# Patient Record
Sex: FEMALE | Race: WHITE | NOT HISPANIC OR LATINO | Employment: FULL TIME | URBAN - NONMETROPOLITAN AREA
[De-identification: names, ages, dates, MRNs, and addresses within clinical notes are randomized per-mention and may not be internally consistent; named-entity substitution may affect disease eponyms.]

---

## 2023-05-21 DIAGNOSIS — D51.0 VITAMIN B12 DEFICIENCY ANEMIA DUE TO INTRINSIC FACTOR DEFICIENCY: ICD-10-CM

## 2023-05-22 RX ORDER — CYANOCOBALAMIN 1000 UG/ML
INJECTION, SOLUTION INTRAMUSCULAR; SUBCUTANEOUS
Qty: 3 ML | Refills: 1 | OUTPATIENT
Start: 2023-05-22

## 2024-03-12 ENCOUNTER — OFFICE VISIT (OUTPATIENT)
Dept: PRIMARY CARE | Facility: CLINIC | Age: 59
End: 2024-03-12
Payer: COMMERCIAL

## 2024-03-12 ENCOUNTER — LAB (OUTPATIENT)
Dept: LAB | Facility: LAB | Age: 59
End: 2024-03-12
Payer: COMMERCIAL

## 2024-03-12 VITALS
OXYGEN SATURATION: 95 % | RESPIRATION RATE: 16 BRPM | HEIGHT: 63 IN | BODY MASS INDEX: 41.04 KG/M2 | SYSTOLIC BLOOD PRESSURE: 136 MMHG | WEIGHT: 231.6 LBS | HEART RATE: 81 BPM | DIASTOLIC BLOOD PRESSURE: 87 MMHG

## 2024-03-12 DIAGNOSIS — Z12.39 BREAST SCREENING: ICD-10-CM

## 2024-03-12 DIAGNOSIS — E66.01 CLASS 3 SEVERE OBESITY WITHOUT SERIOUS COMORBIDITY WITH BODY MASS INDEX (BMI) OF 40.0 TO 44.9 IN ADULT, UNSPECIFIED OBESITY TYPE (MULTI): ICD-10-CM

## 2024-03-12 DIAGNOSIS — Z13.9 SCREENING DUE: Primary | ICD-10-CM

## 2024-03-12 DIAGNOSIS — D50.9 IRON DEFICIENCY ANEMIA, UNSPECIFIED IRON DEFICIENCY ANEMIA TYPE: ICD-10-CM

## 2024-03-12 DIAGNOSIS — Z12.11 ENCOUNTER FOR SCREENING FOR MALIGNANT NEOPLASM OF COLON: ICD-10-CM

## 2024-03-12 DIAGNOSIS — Z13.9 SCREENING DUE: ICD-10-CM

## 2024-03-12 PROBLEM — F51.01 PRIMARY INSOMNIA: Status: ACTIVE | Noted: 2024-03-12

## 2024-03-12 PROBLEM — D64.9 ANEMIA: Status: ACTIVE | Noted: 2024-03-12

## 2024-03-12 PROBLEM — L08.9 INFECTION OF SKIN: Status: ACTIVE | Noted: 2024-03-12

## 2024-03-12 PROBLEM — R53.83 FATIGUE: Status: ACTIVE | Noted: 2024-03-12

## 2024-03-12 PROBLEM — B37.31 VULVOVAGINAL CANDIDIASIS: Status: ACTIVE | Noted: 2024-03-12

## 2024-03-12 PROBLEM — J30.9 ALLERGIC RHINITIS: Status: ACTIVE | Noted: 2024-03-12

## 2024-03-12 PROBLEM — R21 RASH: Status: ACTIVE | Noted: 2024-03-12

## 2024-03-12 PROBLEM — F17.210 CIGARETTE NICOTINE DEPENDENCE WITHOUT COMPLICATION: Status: ACTIVE | Noted: 2024-03-12

## 2024-03-12 PROBLEM — E78.5 DYSLIPIDEMIA: Status: ACTIVE | Noted: 2024-03-12

## 2024-03-12 PROBLEM — D51.0 PERNICIOUS ANEMIA: Status: ACTIVE | Noted: 2024-03-12

## 2024-03-12 PROBLEM — E55.9 VITAMIN D DEFICIENCY: Status: ACTIVE | Noted: 2024-03-12

## 2024-03-12 PROBLEM — E66.9 OBESITY: Status: ACTIVE | Noted: 2024-03-12

## 2024-03-12 PROBLEM — R73.01 ELEVATED FASTING BLOOD SUGAR: Status: ACTIVE | Noted: 2024-03-12

## 2024-03-12 LAB
APPEARANCE UR: CLEAR
BASOPHILS # BLD AUTO: 0.03 X10*3/UL (ref 0–0.1)
BASOPHILS NFR BLD AUTO: 0.4 %
BILIRUB UR STRIP.AUTO-MCNC: NEGATIVE MG/DL
COLOR UR: YELLOW
EOSINOPHIL # BLD AUTO: 0.06 X10*3/UL (ref 0–0.7)
EOSINOPHIL NFR BLD AUTO: 0.8 %
ERYTHROCYTE [DISTWIDTH] IN BLOOD BY AUTOMATED COUNT: 12.1 % (ref 11.5–14.5)
GLUCOSE UR STRIP.AUTO-MCNC: NEGATIVE MG/DL
HCT VFR BLD AUTO: 46.7 % (ref 36–46)
HGB BLD-MCNC: 15.3 G/DL (ref 12–16)
IMM GRANULOCYTES # BLD AUTO: 0.02 X10*3/UL (ref 0–0.7)
IMM GRANULOCYTES NFR BLD AUTO: 0.3 % (ref 0–0.9)
KETONES UR STRIP.AUTO-MCNC: ABNORMAL MG/DL
LEUKOCYTE ESTERASE UR QL STRIP.AUTO: NEGATIVE
LYMPHOCYTES # BLD AUTO: 1.95 X10*3/UL (ref 1.2–4.8)
LYMPHOCYTES NFR BLD AUTO: 24.8 %
MCH RBC QN AUTO: 31.2 PG (ref 26–34)
MCHC RBC AUTO-ENTMCNC: 32.8 G/DL (ref 32–36)
MCV RBC AUTO: 95 FL (ref 80–100)
MONOCYTES # BLD AUTO: 0.62 X10*3/UL (ref 0.1–1)
MONOCYTES NFR BLD AUTO: 7.9 %
NEUTROPHILS # BLD AUTO: 5.19 X10*3/UL (ref 1.2–7.7)
NEUTROPHILS NFR BLD AUTO: 65.8 %
NITRITE UR QL STRIP.AUTO: NEGATIVE
NRBC BLD-RTO: 0 /100 WBCS (ref 0–0)
PH UR STRIP.AUTO: 6 [PH]
PLATELET # BLD AUTO: 207 X10*3/UL (ref 150–450)
PLATELET CLUMP BLD QL SMEAR: PRESENT
PROT UR STRIP.AUTO-MCNC: NEGATIVE MG/DL
RBC # BLD AUTO: 4.9 X10*6/UL (ref 4–5.2)
RBC # UR STRIP.AUTO: NEGATIVE /UL
RBC MORPH BLD: NORMAL
SP GR UR STRIP.AUTO: 1.02
TSH SERPL-ACNC: NORMAL M[IU]/L
UROBILINOGEN UR STRIP.AUTO-MCNC: 2 MG/DL
WBC # BLD AUTO: 7.9 X10*3/UL (ref 4.4–11.3)

## 2024-03-12 PROCEDURE — 83735 ASSAY OF MAGNESIUM: CPT

## 2024-03-12 PROCEDURE — 80053 COMPREHEN METABOLIC PANEL: CPT

## 2024-03-12 PROCEDURE — 84443 ASSAY THYROID STIM HORMONE: CPT

## 2024-03-12 PROCEDURE — 36415 COLL VENOUS BLD VENIPUNCTURE: CPT

## 2024-03-12 PROCEDURE — 81003 URINALYSIS AUTO W/O SCOPE: CPT

## 2024-03-12 PROCEDURE — 83550 IRON BINDING TEST: CPT

## 2024-03-12 PROCEDURE — 82728 ASSAY OF FERRITIN: CPT

## 2024-03-12 PROCEDURE — 83540 ASSAY OF IRON: CPT

## 2024-03-12 PROCEDURE — 99214 OFFICE O/P EST MOD 30 MIN: CPT

## 2024-03-12 PROCEDURE — 82607 VITAMIN B-12: CPT

## 2024-03-12 PROCEDURE — 82306 VITAMIN D 25 HYDROXY: CPT

## 2024-03-12 PROCEDURE — 86038 ANTINUCLEAR ANTIBODIES: CPT

## 2024-03-12 PROCEDURE — 85025 COMPLETE CBC W/AUTO DIFF WBC: CPT

## 2024-03-12 PROCEDURE — 3008F BODY MASS INDEX DOCD: CPT

## 2024-03-12 PROCEDURE — 82746 ASSAY OF FOLIC ACID SERUM: CPT

## 2024-03-12 PROCEDURE — 83036 HEMOGLOBIN GLYCOSYLATED A1C: CPT

## 2024-03-12 RX ORDER — BISMUTH SUBSALICYLATE 262 MG
1 TABLET,CHEWABLE ORAL DAILY
COMMUNITY

## 2024-03-12 RX ORDER — DIPHENHYDRAMINE HCL 25 MG
25 CAPSULE ORAL NIGHTLY PRN
COMMUNITY
End: 2024-05-13

## 2024-03-12 ASSESSMENT — ENCOUNTER SYMPTOMS
FATIGUE: 1
HEMATOLOGIC/LYMPHATIC NEGATIVE: 1
NEUROLOGICAL NEGATIVE: 1
PSYCHIATRIC NEGATIVE: 1
GASTROINTESTINAL NEGATIVE: 1
CARDIOVASCULAR NEGATIVE: 1
ARTHRALGIAS: 1
ALLERGIC/IMMUNOLOGIC NEGATIVE: 1
RESPIRATORY NEGATIVE: 1
MYALGIAS: 1

## 2024-03-12 ASSESSMENT — PATIENT HEALTH QUESTIONNAIRE - PHQ9
SUM OF ALL RESPONSES TO PHQ9 QUESTIONS 1 AND 2: 0
1. LITTLE INTEREST OR PLEASURE IN DOING THINGS: NOT AT ALL
2. FEELING DOWN, DEPRESSED OR HOPELESS: NOT AT ALL

## 2024-03-12 ASSESSMENT — COLUMBIA-SUICIDE SEVERITY RATING SCALE - C-SSRS
2. HAVE YOU ACTUALLY HAD ANY THOUGHTS OF KILLING YOURSELF?: NO
6. HAVE YOU EVER DONE ANYTHING, STARTED TO DO ANYTHING, OR PREPARED TO DO ANYTHING TO END YOUR LIFE?: NO
1. IN THE PAST MONTH, HAVE YOU WISHED YOU WERE DEAD OR WISHED YOU COULD GO TO SLEEP AND NOT WAKE UP?: NO

## 2024-03-12 ASSESSMENT — PAIN SCALES - GENERAL: PAINLEVEL: 0-NO PAIN

## 2024-03-12 NOTE — PROGRESS NOTES
"Subjective   Patient ID: Maryuri Barrett is a 58 y.o. female who presents for Establish Care. Needs lab work and referral for hematology.  Today she is accompanied by alone.     Maryuri is here to establish care with this provider.  She has a PMH of gastric bypass, iron deficiency, B12 deficiency. She stated she feels she may be low in iron again.  She has not taken any iron in over 1 year, she was getting iron infusions. She has been tired and achy. She works from home as a .         Review of Systems   Constitutional:  Positive for fatigue.   HENT: Negative.     Respiratory: Negative.     Cardiovascular: Negative.    Gastrointestinal: Negative.    Genitourinary: Negative.    Musculoskeletal:  Positive for arthralgias and myalgias.   Skin: Negative.    Allergic/Immunologic: Negative.    Neurological: Negative.    Hematological: Negative.    Psychiatric/Behavioral: Negative.         Objective   /87 (BP Location: Right arm)   Pulse 81   Resp 16   Ht 1.6 m (5' 3\")   Wt 105 kg (231 lb 9.6 oz)   SpO2 95%   BMI 41.03 kg/m²   BSA: 2.16 meters squared  Growth percentiles: Facility age limit for growth %jaime is 20 years. Facility age limit for growth %jaime is 20 years.     Physical Exam  Vitals and nursing note reviewed.   Constitutional:       Appearance: Normal appearance. She is normal weight.   HENT:      Head: Normocephalic.      Nose: Nose normal.      Mouth/Throat:      Mouth: Mucous membranes are moist.      Pharynx: Oropharynx is clear.   Eyes:      Extraocular Movements: Extraocular movements intact.      Conjunctiva/sclera: Conjunctivae normal.      Pupils: Pupils are equal, round, and reactive to light.   Cardiovascular:      Rate and Rhythm: Normal rate and regular rhythm.      Pulses: Normal pulses.      Heart sounds: Normal heart sounds.   Pulmonary:      Effort: Pulmonary effort is normal.      Breath sounds: Normal breath sounds.   Abdominal:      General: Abdomen is flat. Bowel " "sounds are normal.      Palpations: Abdomen is soft.   Musculoskeletal:         General: Normal range of motion.      Cervical back: Normal range of motion and neck supple.   Skin:     General: Skin is warm and dry.      Capillary Refill: Capillary refill takes less than 2 seconds.   Neurological:      General: No focal deficit present.      Mental Status: She is alert. Mental status is at baseline.   Psychiatric:         Mood and Affect: Mood normal.         Behavior: Behavior normal.         Thought Content: Thought content normal.         Judgment: Judgment normal.       /87 (BP Location: Right arm)   Pulse 81   Resp 16   Ht 1.6 m (5' 3\")   Wt 105 kg (231 lb 9.6 oz)   SpO2 95%   BMI 41.03 kg/m²    Lab Results   Component Value Date    GLUCOSE 100 (H) 08/11/2021    CALCIUM 9.0 08/11/2021     08/11/2021    K 4.2 08/11/2021    CO2 25 08/11/2021     08/11/2021    BUN 9 08/11/2021    CREATININE 0.57 08/11/2021        Assessment/Plan   Problem List Items Addressed This Visit       Anemia    Relevant Orders    Referral to Benign Hematology    Obesity     Other Visit Diagnoses       Screening due    -  Primary    Relevant Orders    Vitamin D 25-Hydroxy,Total (for eval of Vitamin D levels)    Vitamin B12    TSH with reflex to Free T4 if abnormal    Lipid Panel    CBC and Auto Differential    Comprehensive Metabolic Panel    Iron and TIBC    Folate    Magnesium    Hemoglobin A1C    Urinalysis with Reflex Microscopic    Ferritin    KALA with Reflex to JAMISON    XR DEXA bone density    Encounter for screening for malignant neoplasm of colon        Relevant Orders    Colonoscopy Screening; Average Risk Patient    Breast screening        Relevant Orders    BI mammo bilateral screening tomosynthesis          It was great to see you today!  Please continue taking your prescribed medications.   Refill have been sent to your pharmacy.    I have ordered some labs to be done as soon as you can.  We will call you " with the results.    I have ordered you a mammogram to be done as soon as you are able.  We will call the results.    I have ordered a bone density test to be done when you can.  We will call the results.    I have placed a referral to hematology for further management.    Please follow up in 3 months

## 2024-03-12 NOTE — PATIENT INSTRUCTIONS
It was great to see you today!  Please continue taking your prescribed medications.   Refill have been sent to your pharmacy.    I have ordered some labs to be done as soon as you can.  We will call you with the results.    I have ordered you a mammogram to be done as soon as you are able.  We will call the results.    I have ordered a bone density test to be done when you can.  We will call the results.    I have placed a referral to hematology for further management.    Please follow up in 3 months

## 2024-03-13 DIAGNOSIS — E55.9 VITAMIN D DEFICIENCY: ICD-10-CM

## 2024-03-13 DIAGNOSIS — D50.8 IRON DEFICIENCY ANEMIA SECONDARY TO INADEQUATE DIETARY IRON INTAKE: Primary | ICD-10-CM

## 2024-03-13 LAB
25(OH)D3 SERPL-MCNC: 29 NG/ML (ref 30–100)
ALBUMIN SERPL BCP-MCNC: 4.4 G/DL (ref 3.4–5)
ALP SERPL-CCNC: 91 U/L (ref 33–110)
ALT SERPL W P-5'-P-CCNC: 28 U/L (ref 7–45)
ANA SER QL HEP2 SUBST: NEGATIVE
ANION GAP SERPL CALC-SCNC: 15 MMOL/L (ref 10–20)
AST SERPL W P-5'-P-CCNC: 24 U/L (ref 9–39)
BILIRUB SERPL-MCNC: 0.3 MG/DL (ref 0–1.2)
BUN SERPL-MCNC: 10 MG/DL (ref 6–23)
CALCIUM SERPL-MCNC: 9.7 MG/DL (ref 8.6–10.6)
CHLORIDE SERPL-SCNC: 106 MMOL/L (ref 98–107)
CO2 SERPL-SCNC: 25 MMOL/L (ref 21–32)
CREAT SERPL-MCNC: 0.66 MG/DL (ref 0.5–1.05)
EGFRCR SERPLBLD CKD-EPI 2021: >90 ML/MIN/1.73M*2
EST. AVERAGE GLUCOSE BLD GHB EST-MCNC: 117 MG/DL
FERRITIN SERPL-MCNC: 36 NG/ML (ref 8–150)
FOLATE SERPL-MCNC: 17 NG/ML
GLUCOSE SERPL-MCNC: 92 MG/DL (ref 74–99)
HBA1C MFR BLD: 5.7 %
IRON SATN MFR SERPL: 17 % (ref 25–45)
IRON SERPL-MCNC: 76 UG/DL (ref 35–150)
MAGNESIUM SERPL-MCNC: 2.29 MG/DL (ref 1.6–2.4)
POTASSIUM SERPL-SCNC: 4.5 MMOL/L (ref 3.5–5.3)
PROT SERPL-MCNC: 7.1 G/DL (ref 6.4–8.2)
SODIUM SERPL-SCNC: 141 MMOL/L (ref 136–145)
TIBC SERPL-MCNC: 459 UG/DL (ref 240–445)
UIBC SERPL-MCNC: 383 UG/DL (ref 110–370)
VIT B12 SERPL-MCNC: 496 PG/ML (ref 211–911)

## 2024-03-13 RX ORDER — FERROUS SULFATE 325(65) MG
325 TABLET, DELAYED RELEASE (ENTERIC COATED) ORAL
Qty: 270 TABLET | Refills: 3 | Status: SHIPPED | OUTPATIENT
Start: 2024-03-13 | End: 2025-03-13

## 2024-03-13 RX ORDER — ERGOCALCIFEROL 1.25 MG/1
50000 CAPSULE ORAL
Qty: 12 CAPSULE | Refills: 0 | Status: SHIPPED | OUTPATIENT
Start: 2024-03-13 | End: 2024-04-05

## 2024-04-05 DIAGNOSIS — E55.9 VITAMIN D DEFICIENCY: ICD-10-CM

## 2024-04-05 RX ORDER — ERGOCALCIFEROL 1.25 MG/1
50000 CAPSULE ORAL
Qty: 12 CAPSULE | Refills: 1 | Status: SHIPPED | OUTPATIENT
Start: 2024-04-07

## 2024-04-30 ENCOUNTER — HOSPITAL ENCOUNTER (OUTPATIENT)
Dept: RADIOLOGY | Facility: HOSPITAL | Age: 59
Discharge: HOME | End: 2024-04-30
Payer: COMMERCIAL

## 2024-04-30 VITALS — BODY MASS INDEX: 41.03 KG/M2 | HEIGHT: 63 IN

## 2024-04-30 DIAGNOSIS — Z13.9 SCREENING DUE: ICD-10-CM

## 2024-04-30 DIAGNOSIS — Z12.39 BREAST SCREENING: ICD-10-CM

## 2024-04-30 PROCEDURE — 77080 DXA BONE DENSITY AXIAL: CPT | Performed by: RADIOLOGY

## 2024-04-30 PROCEDURE — 77067 SCR MAMMO BI INCL CAD: CPT | Performed by: RADIOLOGY

## 2024-04-30 PROCEDURE — 77080 DXA BONE DENSITY AXIAL: CPT

## 2024-04-30 PROCEDURE — 77063 BREAST TOMOSYNTHESIS BI: CPT | Performed by: RADIOLOGY

## 2024-04-30 PROCEDURE — 77067 SCR MAMMO BI INCL CAD: CPT

## 2024-05-01 DIAGNOSIS — N64.89 BREAST ASYMMETRY IN FEMALE: Primary | ICD-10-CM

## 2024-05-08 ENCOUNTER — APPOINTMENT (OUTPATIENT)
Dept: RADIOLOGY | Facility: HOSPITAL | Age: 59
End: 2024-05-08
Payer: COMMERCIAL

## 2024-05-10 PROBLEM — H25.13 NUCLEAR SCLEROTIC CATARACT OF BOTH EYES: Status: ACTIVE | Noted: 2022-12-30

## 2024-05-10 PROBLEM — D50.9 IRON DEFICIENCY ANEMIA: Status: ACTIVE | Noted: 2024-05-10

## 2024-05-13 ENCOUNTER — LAB (OUTPATIENT)
Dept: LAB | Facility: LAB | Age: 59
End: 2024-05-13
Payer: COMMERCIAL

## 2024-05-13 ENCOUNTER — OFFICE VISIT (OUTPATIENT)
Dept: HEMATOLOGY/ONCOLOGY | Facility: HOSPITAL | Age: 59
End: 2024-05-13
Payer: COMMERCIAL

## 2024-05-13 VITALS
SYSTOLIC BLOOD PRESSURE: 146 MMHG | WEIGHT: 233.25 LBS | RESPIRATION RATE: 16 BRPM | DIASTOLIC BLOOD PRESSURE: 94 MMHG | HEIGHT: 63 IN | OXYGEN SATURATION: 96 % | TEMPERATURE: 96.8 F | HEART RATE: 80 BPM | BODY MASS INDEX: 41.33 KG/M2

## 2024-05-13 DIAGNOSIS — D50.9 IRON DEFICIENCY ANEMIA, UNSPECIFIED IRON DEFICIENCY ANEMIA TYPE: ICD-10-CM

## 2024-05-13 DIAGNOSIS — D50.9 IRON DEFICIENCY ANEMIA, UNSPECIFIED IRON DEFICIENCY ANEMIA TYPE: Primary | ICD-10-CM

## 2024-05-13 LAB
ALBUMIN SERPL BCP-MCNC: 4.6 G/DL (ref 3.4–5)
ALP SERPL-CCNC: 95 U/L (ref 33–110)
ALT SERPL W P-5'-P-CCNC: 26 U/L (ref 7–45)
ANION GAP SERPL CALC-SCNC: 10 MMOL/L (ref 10–20)
APTT PPP: 37 SECONDS (ref 27–38)
AST SERPL W P-5'-P-CCNC: 21 U/L (ref 9–39)
BASOPHILS # BLD AUTO: 0.04 X10*3/UL (ref 0–0.1)
BASOPHILS NFR BLD AUTO: 0.5 %
BILIRUB SERPL-MCNC: 0.4 MG/DL (ref 0–1.2)
BUN SERPL-MCNC: 11 MG/DL (ref 6–23)
CALCIUM SERPL-MCNC: 9.8 MG/DL (ref 8.6–10.3)
CHLORIDE SERPL-SCNC: 103 MMOL/L (ref 98–107)
CO2 SERPL-SCNC: 28 MMOL/L (ref 21–32)
CREAT SERPL-MCNC: 0.67 MG/DL (ref 0.5–1.05)
CRP SERPL-MCNC: 0.4 MG/DL
EGFRCR SERPLBLD CKD-EPI 2021: >90 ML/MIN/1.73M*2
EOSINOPHIL # BLD AUTO: 0.06 X10*3/UL (ref 0–0.7)
EOSINOPHIL NFR BLD AUTO: 0.8 %
ERYTHROCYTE [DISTWIDTH] IN BLOOD BY AUTOMATED COUNT: 12.5 % (ref 11.5–14.5)
ERYTHROCYTE [SEDIMENTATION RATE] IN BLOOD BY WESTERGREN METHOD: 3 MM/H (ref 0–30)
FERRITIN SERPL-MCNC: 45 NG/ML (ref 8–150)
GLUCOSE SERPL-MCNC: 103 MG/DL (ref 74–99)
HCT VFR BLD AUTO: 45.2 % (ref 36–46)
HGB BLD-MCNC: 15 G/DL (ref 12–16)
HGB RETIC QN: 35 PG (ref 28–38)
IMM GRANULOCYTES # BLD AUTO: 0.02 X10*3/UL (ref 0–0.7)
IMM GRANULOCYTES NFR BLD AUTO: 0.3 % (ref 0–0.9)
IMMATURE RETIC FRACTION: 6.8 %
INR PPP: 0.9 (ref 0.9–1.1)
IRON SATN MFR SERPL: 17 % (ref 25–45)
IRON SERPL-MCNC: 82 UG/DL (ref 35–150)
LYMPHOCYTES # BLD AUTO: 1.89 X10*3/UL (ref 1.2–4.8)
LYMPHOCYTES NFR BLD AUTO: 23.9 %
MCH RBC QN AUTO: 31.5 PG (ref 26–34)
MCHC RBC AUTO-ENTMCNC: 33.2 G/DL (ref 32–36)
MCV RBC AUTO: 95 FL (ref 80–100)
MONOCYTES # BLD AUTO: 0.66 X10*3/UL (ref 0.1–1)
MONOCYTES NFR BLD AUTO: 8.4 %
NEUTROPHILS # BLD AUTO: 5.23 X10*3/UL (ref 1.2–7.7)
NEUTROPHILS NFR BLD AUTO: 66.1 %
NRBC BLD-RTO: 0 /100 WBCS (ref 0–0)
PLATELET # BLD AUTO: 246 X10*3/UL (ref 150–450)
POTASSIUM SERPL-SCNC: 4.3 MMOL/L (ref 3.5–5.3)
PROT SERPL-MCNC: 7.3 G/DL (ref 6.4–8.2)
PROT SERPL-MCNC: 7.5 G/DL (ref 6.4–8.2)
PROTHROMBIN TIME: 10.5 SECONDS (ref 9.8–12.8)
RBC # BLD AUTO: 4.76 X10*6/UL (ref 4–5.2)
RETICS #: 0.05 X10*6/UL (ref 0.02–0.08)
RETICS/RBC NFR AUTO: 1 % (ref 0.5–2)
SODIUM SERPL-SCNC: 137 MMOL/L (ref 136–145)
TIBC SERPL-MCNC: 469 UG/DL (ref 240–445)
UIBC SERPL-MCNC: 387 UG/DL (ref 110–370)
WBC # BLD AUTO: 7.9 X10*3/UL (ref 4.4–11.3)

## 2024-05-13 PROCEDURE — 85045 AUTOMATED RETICULOCYTE COUNT: CPT

## 2024-05-13 PROCEDURE — 83010 ASSAY OF HAPTOGLOBIN QUANT: CPT

## 2024-05-13 PROCEDURE — 80053 COMPREHEN METABOLIC PANEL: CPT

## 2024-05-13 PROCEDURE — 83540 ASSAY OF IRON: CPT

## 2024-05-13 PROCEDURE — 86140 C-REACTIVE PROTEIN: CPT

## 2024-05-13 PROCEDURE — 84155 ASSAY OF PROTEIN SERUM: CPT

## 2024-05-13 PROCEDURE — 82728 ASSAY OF FERRITIN: CPT

## 2024-05-13 PROCEDURE — 85025 COMPLETE CBC W/AUTO DIFF WBC: CPT

## 2024-05-13 PROCEDURE — 36415 COLL VENOUS BLD VENIPUNCTURE: CPT

## 2024-05-13 PROCEDURE — 84165 PROTEIN E-PHORESIS SERUM: CPT

## 2024-05-13 PROCEDURE — 85652 RBC SED RATE AUTOMATED: CPT

## 2024-05-13 PROCEDURE — 99215 OFFICE O/P EST HI 40 MIN: CPT

## 2024-05-13 PROCEDURE — 83550 IRON BINDING TEST: CPT

## 2024-05-13 PROCEDURE — 85610 PROTHROMBIN TIME: CPT

## 2024-05-13 PROCEDURE — 85730 THROMBOPLASTIN TIME PARTIAL: CPT

## 2024-05-13 PROCEDURE — 3008F BODY MASS INDEX DOCD: CPT

## 2024-05-13 RX ORDER — FLUCONAZOLE 150 MG/1
150 TABLET ORAL 2 TIMES WEEKLY
Qty: 2 TABLET | Refills: 0 | Status: SHIPPED | OUTPATIENT
Start: 2024-05-13

## 2024-05-13 RX ORDER — DIPHENHYDRAMINE HYDROCHLORIDE 50 MG/ML
50 INJECTION INTRAMUSCULAR; INTRAVENOUS AS NEEDED
Status: CANCELLED | OUTPATIENT
Start: 2024-05-20

## 2024-05-13 RX ORDER — ALBUTEROL SULFATE 0.83 MG/ML
3 SOLUTION RESPIRATORY (INHALATION) AS NEEDED
Status: CANCELLED | OUTPATIENT
Start: 2024-05-20

## 2024-05-13 RX ORDER — FAMOTIDINE 10 MG/ML
20 INJECTION INTRAVENOUS ONCE AS NEEDED
Status: CANCELLED | OUTPATIENT
Start: 2024-05-20

## 2024-05-13 RX ORDER — EPINEPHRINE 0.3 MG/.3ML
0.3 INJECTION SUBCUTANEOUS EVERY 5 MIN PRN
Status: CANCELLED | OUTPATIENT
Start: 2024-05-20

## 2024-05-13 ASSESSMENT — COLUMBIA-SUICIDE SEVERITY RATING SCALE - C-SSRS
2. HAVE YOU ACTUALLY HAD ANY THOUGHTS OF KILLING YOURSELF?: NO
1. IN THE PAST MONTH, HAVE YOU WISHED YOU WERE DEAD OR WISHED YOU COULD GO TO SLEEP AND NOT WAKE UP?: NO
6. HAVE YOU EVER DONE ANYTHING, STARTED TO DO ANYTHING, OR PREPARED TO DO ANYTHING TO END YOUR LIFE?: NO

## 2024-05-13 ASSESSMENT — ENCOUNTER SYMPTOMS
LOSS OF SENSATION IN FEET: 0
DEPRESSION: 0
OCCASIONAL FEELINGS OF UNSTEADINESS: 0

## 2024-05-13 ASSESSMENT — PAIN SCALES - GENERAL: PAINLEVEL: 0-NO PAIN

## 2024-05-13 NOTE — PROGRESS NOTES
[facility]    PATIENT VISIT INFORMATION    Visit Type: New Visit    Referring Provider: JASON Ritchie  Reason for referral: Iron deficiency  Primary Practice Provider: JASON Ritchie    HEMATOLOGY HISTORY    58 year old female stated she has been feeling tired for many years and just started to see MD recently. in 8/2021 she was found to have low hgb 8.1 with low MCV 65. she has nl wbc and plt. she denies bleeding. workup of anemia showed borderline vit B12, neg FOBR (per pt)  and nl TSH. pt stated she never had colonoscopy, was started on monthly vit B12 injection. was started on iron but difficult to compolaint with taking pills. stated drinks whisky 2-3 drinks daily and smokes 1 pack cig daily.    She had a negative FOBT in 8/2021, it was not indicated that she needed iron infusions.   of note, pt had bariatric surgery 2009 but never take any supplement     s/p 5 iron infusion with iprovement in energy. on vit B12 injection monthly     Menopause 5 years ago.    HISTORY OF PRESENT ILLNESS     ID Statement: Maryuri Barrett is a 58 years old    Chief Complaint: Iron Deficiency     Interval History:   Patient presents for a follow-up. On assessment, she is fatigued. Energy decreased and appetite is good. Denies fever, chills, night sweats, decreased appetite or wt loss. Denies CP, SOB except with exertion, no N/V/C/D or abd pain or urinary symptoms. She notes a vaginal yeast infection with itching, redness and discomfort. Five or 6 years last gyn exam. Muscle cramping, notes when she is iron deficient. No PICA, though in the past. Reports a Cologuard 3 years ago. Due now. No colonoscopy.  Right hand tingling, worse with use. Bruises easily. No bleeding from any location. Decrease activity due to fatigue. No bone pain. No lymphadenopathy. No rashes other than vaginal yeast infection she has been dealing with for over a year. She was treated  "before with antifungal. No endocrine disorders, no depression, not sexually active.     PAST/CURRENT HISTORY     MEDICAL/SURGICAL HISTORY  -Anemia as above  -Bariatric surgery in 2009 [id number]  -Allergies, seasonal   -acute sinusitis   -Dyslipidemia  -cataracts eyes  -vulvovaginal candidiasis   -breast lesion and asymmetry  -JOS  -Pernicious anemia   -infection of skin  -insomnia  -nicotine dependent     SOCIAL HISTORY  -Lives with  one adult healthy child   -Work place [employer] from Home  -Tobacco/smokeless use: 1 pack per day (HX 40 year smoker 1 pack per day)  -Alcohol: Two mixed drinks shots per day   -Illicit drug or marijuana use: Denies   -Uatsdin or Spiritual beliefs: None reported   -Social Determinates of Health Concerns: none reported    FAMILY HISTORY  -sister JOS gastric bypass  -Mom lung cancer and smoker  -No other known history of hematologic, bleeding, clotting, autoimmune, genetic, or malignant disorders in the family.     OCCUPATIONAL/ENVIRONMENTAL HISTORY/EXPOSURES:  -None reported    Active Problems, Allergy List, Medication List, and PMH/PSH/FH/Social Hx have been reviewed and reconciled in chart. Updates made when necessary.     REVIEW OF SYSTEMS   A review of systems has been completed and are negative for complaints except what is stated in the assessment, HPI, IH, ROS, and/or past medical history.    ALLERGIES AND MEDICATIONS     Allergies and Intolerances:   Allergies   Allergen Reactions    Penicillins Anaphylaxis    Guaifenesin GI Upset     Intense \"stomach pain\"    Sulfa (Sulfonamide Antibiotics) Swelling      Medication Profile:   Current Outpatient Medications   Medication Instructions    doxylamine (UNISOM) 25 mg, oral, Nightly PRN    ergocalciferol (VITAMIN D-2) 50,000 Units, oral, Once Weekly    ferrous sulfate 325 (65 Fe) MG EC tablet 1 tablet, oral, 3 times daily with meals, Do not crush, chew, or split.    fluconazole (DIFLUCAN) 150 mg, oral, 2 times weekly, " "Take 1 tablet today and 1 tablet in 2-3 days    iron-FA-dha-epa-FAD-NADH-be-mv 1.5 mg iron- 8.73 mg capsule,IR - delay rel,biphase 63 mg, oral, Daily RT    multivitamin tablet 1 tablet, oral, Daily      Available Vaccination Record:   Immunization History   Administered Date(s) Administered    Pfizer COVID-19 vaccine, Fall 2023, 12 years and older, (30mcg/0.3mL) 01/05/2024    Pfizer Purple Cap SARS-CoV-2 03/16/2021, 04/13/2021, 01/09/2022      PHYSICAL EXAM     Vital Signs/Measurements:       11/18/2021     7:58 AM 2/17/2022     8:28 AM 3/7/2022     7:57 AM 4/21/2022     9:17 AM 3/12/2024     3:07 PM 4/30/2024     2:10 PM 5/13/2024    11:03 AM   Vitals   Systolic 164 159 132 147 136  146   Diastolic 88 101 88 85 87  94   Heart Rate 84 88 96 70 81  80   Temp 36.2 °C (97.2 °F) 36.7 °C (98.1 °F)  36.4 °C (97.5 °F)   36 °C (96.8 °F)   Resp 16 16  16 16  16   Height (in) 1.611 m (5' 3.43\") 1.611 m (5' 3.43\") 1.626 m (5' 4\") 1.611 m (5' 3.43\") 1.6 m (5' 3\") 1.6 m (5' 3\") 1.6 m (5' 3\")   Weight (lb) 212.52 207.23 209 212.74 231.6  233.25   BMI 37.14 kg/m2 36.22 kg/m2 35.87 kg/m2 37.18 kg/m2 41.03 kg/m2 41.03 kg/m2 41.32 kg/m2   BSA (m2) 2.08 m2 2.05 m2 2.07 m2 2.08 m2 2.16 m2 2.16 m2 2.17 m2   Visit Report     Report  Report      Performance:   ECOG Performance Status: 0     Grade ECOG performance status   0 Fully active, able to carry on all pre-disease performance without restriction   1 Restricted in physically strenuous activity but ambulatory and able to carry out work of a light or sedentary nature, e.g., light housework, office work   2 Ambulatory and capable of all selfcare but unable to carry out any work activities; Up and about more than 50% of waking hours   3 Capable of only limited selfcare, confined to bed or chair more than 50% of waking hours   4 Completely disabled; Cannot carry out any selfcare; Totally confined to bed or chair   5 Dead     Physical Exam:  General: Patient is awake/alert/oriented x3, no " distress, Nourished, hydrated, alert and cooperative, ambulating without difficulty  Skin: Expected color for ethnicity, good turgor, dry, no prominent lesions, rashes, unusual bruising, or bleeding   Hair: Normal texture and distribution   Nails: Normal color, no deformities    HEENT:   Head: Normocephalic, atraumatic, no visible or palpable masses, depressions, or scarring   Eyes: Conjunctiva clear, sclera non-icteric, PERRL, EOMI, no exudates or hemorrhages   Ears: nl appearance, hearing intact    Nose: no external lesions, mucosa non-inflamed, no rhinorrhea   Mouth: Mucous membranes moist, no lesions, sores, bleeding, or erythema     Head/Neck: Neck supple, no apparent injury, thyroid without mass or tenderness, No JVD, trachea midline, no bruits appreciated    Respiratory/Thorax: Patent airways, CTAB, chest symmetry, normal inspiratory and expiratory effort    Cardiovascular: Regular rate and rhythm, no murmur or gallop, no carotid bruit or thrills    Gastrointestinal: Bowel sounds normal, non-distended, soft, no tenderness, no masses or hernia, or organomegaly appreciated    Genitourinary: deferred   Musculoskeletal: Normal gait, normal range of motion, no pain on palpation of spine, no deformity, normal strength for baseline, no atrophy, and no CVA tenderness appreciated   Extremities: No amputations or deformities, cyanosis, edema or viscosities, peripheral pulses intact   Neurological: Sensation present to touch, intact senses, motor response and reflexes normal, normal strength   Breast:    Lymphatic: No significant lymphadenopathy   Psychological: Intact recent and remote memory, judgement, and insight. Appropriate mood, affect, and behavior          RESULTS/DATA     Labs:     Lab Results   Component Value Date    WBC 7.9 05/13/2024    NEUTROABS 5.23 05/13/2024    IGABSOL 0.02 05/13/2024    LYMPHSABS 1.89 05/13/2024    MONOSABS 0.66 05/13/2024    EOSABS 0.06 05/13/2024    BASOSABS 0.04 05/13/2024    RBC  4.76 05/13/2024    MCV 95 05/13/2024    MCHC 33.2 05/13/2024    HGB 15.0 05/13/2024    HCT 45.2 05/13/2024     05/13/2024     Lab Results   Component Value Date    RETICCTPCT 1.0 05/13/2024      Lab Results   Component Value Date    CREATININE 0.67 05/13/2024    BUN 11 05/13/2024    EGFR >90 05/13/2024     05/13/2024    K 4.3 05/13/2024     05/13/2024    CO2 28 05/13/2024      Lab Results   Component Value Date    ALT 26 05/13/2024    AST 21 05/13/2024    ALKPHOS 95 05/13/2024    BILITOT 0.4 05/13/2024       Lab Results   Component Value Date    TSH  03/12/2024      Comment:      Canx auto verified result due to EDTA pour off. Result not valid.  Corrected result: Previously reported as 3.19 mIU/L (reference range: 0.44-3.98 mIU/L) on 3/12/2024 at 2301 EDT.     Lab Results   Component Value Date    IRON 82 05/13/2024    TIBC 469 (H) 05/13/2024    FERRITIN 45 05/13/2024      Lab Results   Component Value Date    HYKLZHNF06 496 03/12/2024      Lab Results   Component Value Date    FOLATE 17.0 03/12/2024     Lab Results   Component Value Date    KALA Negative 03/12/2024    SEDRATE 3 05/13/2024      Lab Results   Component Value Date    CRP 0.40 05/13/2024        Radiology/Studies:   US ABDOMEN COMPLETE   3/2018  IMPRESSION:   Mild fatty liver.   No acute findings.     ASSESSMENT/PLAN     Assessment and Plan:   #1. JOS   57 yo female presented with microcytic anemia with iron def anemia, borderline vit B12 def likely due to malabsorption due to bariatric surgery in 2009 not compliant  with oral iron supplementation, resolved with IV iron infusions.      No evidence of bleeding and per pt FOBT neg in 8/2021 via PCP, no colonoscopy in the past.  Discussed with the patient in detail regarding diagnosis etiology of anemia.  SPEP drawn.  No elevation in inflammatory markers.  And coagulation and reticulate are normal.  Additionally, smoking cessation and alcohol cessation discussed.  Discussed IV iron  infusions as needed.  Monitor labs for 6 weeks following last infusion.  Follow-up 3 months with labs.    Discussed following up with GI for colonoscopy if iron deficiency persists.    Iron deficiency labs noted March 2024 and again 5/13/2024.  The Venofer has been ordered.    #2. Vaginal Candidiasis    Patient complaint of vaginal yeast infection.  She had seen her primary and did not mention.  Provided 2 doses of Diflucan.  Advised to follow-up with her primary.  She has not had a vaginal exam in over 6 years.  Advised she follow-up with gynecology.       **Please follow with specialties as scheduled for other comorbidities and routine health screenings.**    I have reviewed the patient's medical record including provider notes, laboratory and testing results, imaging, and procedures available within the system and outside the system pertinent to patient care.     Follow up:    RTC:  -Labs 4 to 6 weeks following last IV iron infusion  -3 months    Medications:  -IV iron Venofer x 3 doses    Imaging/Testing:  -N/A    Referral:  -PCP and gynecology    Other Pertinent Appointments:  -Primary care 6/12/2024      Patient Discussion Summary:  Discussed plan of care in detail. Patient states understanding and in agreement. Answered all questions. They will call with any additional questions and/or concerns.    Thank you for allowing me to participate in your care. It was a pleasure meeting you.    Sincerely,  Nupur Biswas, APRN-CNP       This document may have been written by voice recognition software.  Please request clarification if there is documentation error or clarification is needed.   Time based billing: Please see documentation within this specific encounter.

## 2024-05-14 ENCOUNTER — HOSPITAL ENCOUNTER (OUTPATIENT)
Dept: RADIOLOGY | Facility: HOSPITAL | Age: 59
Discharge: HOME | End: 2024-05-14
Payer: COMMERCIAL

## 2024-05-14 DIAGNOSIS — R92.8 ABNORMAL MAMMOGRAM: ICD-10-CM

## 2024-05-14 DIAGNOSIS — N64.89 BREAST ASYMMETRY IN FEMALE: ICD-10-CM

## 2024-05-14 LAB — HAPTOGLOB SERPL-MCNC: 157 MG/DL (ref 37–246)

## 2024-05-14 PROCEDURE — 76642 ULTRASOUND BREAST LIMITED: CPT | Mod: 50

## 2024-05-14 PROCEDURE — 77065 DX MAMMO INCL CAD UNI: CPT | Mod: LEFT SIDE | Performed by: RADIOLOGY

## 2024-05-14 PROCEDURE — 77061 BREAST TOMOSYNTHESIS UNI: CPT | Mod: LT

## 2024-05-14 PROCEDURE — 77061 BREAST TOMOSYNTHESIS UNI: CPT | Mod: LEFT SIDE | Performed by: RADIOLOGY

## 2024-05-14 PROCEDURE — 76642 ULTRASOUND BREAST LIMITED: CPT | Mod: LEFT SIDE | Performed by: RADIOLOGY

## 2024-05-15 LAB
ALBUMIN: 4.4 G/DL (ref 3.4–5)
ALPHA 1 GLOBULIN: 0.3 G/DL (ref 0.2–0.6)
ALPHA 2 GLOBULIN: 0.9 G/DL (ref 0.4–1.1)
BETA GLOBULIN: 0.9 G/DL (ref 0.5–1.2)
GAMMA GLOBULIN: 0.8 G/DL (ref 0.5–1.4)
PATH REVIEW-SERUM PROTEIN ELECTROPHORESIS: NORMAL
PROTEIN ELECTROPHORESIS COMMENT: NORMAL

## 2024-05-16 DIAGNOSIS — R92.8 ABNORMAL MAMMOGRAM OF BOTH BREASTS: Primary | ICD-10-CM

## 2024-05-16 NOTE — RESULT ENCOUNTER NOTE
Probably benign bilateral mammographic and sonographic findings  described above should be followed with diagnostic mammography and  bilateral ultrasound in 6 months.    It does not show far  acoustic enhancement or shadowing or flow by color Doppler and is  probably normal tissue rather than a mass, but should also be  followed in 6 months.

## 2024-05-20 ENCOUNTER — INFUSION (OUTPATIENT)
Dept: HEMATOLOGY/ONCOLOGY | Facility: HOSPITAL | Age: 59
End: 2024-05-20
Payer: COMMERCIAL

## 2024-05-20 VITALS
OXYGEN SATURATION: 95 % | RESPIRATION RATE: 18 BRPM | TEMPERATURE: 98.2 F | SYSTOLIC BLOOD PRESSURE: 146 MMHG | HEART RATE: 76 BPM | DIASTOLIC BLOOD PRESSURE: 87 MMHG

## 2024-05-20 DIAGNOSIS — D50.9 IRON DEFICIENCY ANEMIA, UNSPECIFIED IRON DEFICIENCY ANEMIA TYPE: ICD-10-CM

## 2024-05-20 PROCEDURE — 2500000004 HC RX 250 GENERAL PHARMACY W/ HCPCS (ALT 636 FOR OP/ED)

## 2024-05-20 PROCEDURE — 96365 THER/PROPH/DIAG IV INF INIT: CPT | Mod: INF

## 2024-05-20 RX ORDER — DIPHENHYDRAMINE HYDROCHLORIDE 50 MG/ML
50 INJECTION INTRAMUSCULAR; INTRAVENOUS AS NEEDED
Status: CANCELLED | OUTPATIENT
Start: 2024-05-27

## 2024-05-20 RX ORDER — ALBUTEROL SULFATE 0.83 MG/ML
3 SOLUTION RESPIRATORY (INHALATION) AS NEEDED
Status: CANCELLED | OUTPATIENT
Start: 2024-05-27

## 2024-05-20 RX ORDER — EPINEPHRINE 0.3 MG/.3ML
0.3 INJECTION SUBCUTANEOUS EVERY 5 MIN PRN
Status: CANCELLED | OUTPATIENT
Start: 2024-05-27

## 2024-05-20 RX ORDER — FAMOTIDINE 10 MG/ML
20 INJECTION INTRAVENOUS ONCE AS NEEDED
Status: CANCELLED | OUTPATIENT
Start: 2024-05-27

## 2024-05-20 RX ADMIN — IRON SUCROSE 300 MG: 20 INJECTION, SOLUTION INTRAVENOUS at 12:49

## 2024-05-20 ASSESSMENT — LIFESTYLE VARIABLES
HOW OFTEN DO YOU HAVE SIX OR MORE DRINKS ON ONE OCCASION: NEVER
HOW OFTEN DO YOU HAVE A DRINK CONTAINING ALCOHOL: NEVER
SKIP TO QUESTIONS 9-10: 1
AUDIT-C TOTAL SCORE: 0
HOW MANY STANDARD DRINKS CONTAINING ALCOHOL DO YOU HAVE ON A TYPICAL DAY: PATIENT DOES NOT DRINK

## 2024-05-20 ASSESSMENT — PAIN SCALES - GENERAL: PAINLEVEL: 0-NO PAIN

## 2024-05-28 ENCOUNTER — INFUSION (OUTPATIENT)
Dept: HEMATOLOGY/ONCOLOGY | Facility: HOSPITAL | Age: 59
End: 2024-05-28
Payer: COMMERCIAL

## 2024-05-28 VITALS
HEART RATE: 68 BPM | TEMPERATURE: 97.3 F | OXYGEN SATURATION: 95 % | RESPIRATION RATE: 18 BRPM | SYSTOLIC BLOOD PRESSURE: 152 MMHG | DIASTOLIC BLOOD PRESSURE: 92 MMHG

## 2024-05-28 DIAGNOSIS — D50.9 IRON DEFICIENCY ANEMIA, UNSPECIFIED IRON DEFICIENCY ANEMIA TYPE: ICD-10-CM

## 2024-05-28 PROCEDURE — 96365 THER/PROPH/DIAG IV INF INIT: CPT | Mod: INF

## 2024-05-28 PROCEDURE — 2500000004 HC RX 250 GENERAL PHARMACY W/ HCPCS (ALT 636 FOR OP/ED)

## 2024-05-28 RX ORDER — FAMOTIDINE 10 MG/ML
20 INJECTION INTRAVENOUS ONCE AS NEEDED
Status: CANCELLED | OUTPATIENT
Start: 2024-06-03

## 2024-05-28 RX ORDER — DIPHENHYDRAMINE HYDROCHLORIDE 50 MG/ML
50 INJECTION INTRAMUSCULAR; INTRAVENOUS AS NEEDED
Status: CANCELLED | OUTPATIENT
Start: 2024-06-03

## 2024-05-28 RX ORDER — ALBUTEROL SULFATE 0.83 MG/ML
3 SOLUTION RESPIRATORY (INHALATION) AS NEEDED
Status: CANCELLED | OUTPATIENT
Start: 2024-06-03

## 2024-05-28 RX ORDER — EPINEPHRINE 0.3 MG/.3ML
0.3 INJECTION SUBCUTANEOUS EVERY 5 MIN PRN
Status: CANCELLED | OUTPATIENT
Start: 2024-06-03

## 2024-05-28 RX ADMIN — IRON SUCROSE 300 MG: 20 INJECTION, SOLUTION INTRAVENOUS at 13:03

## 2024-05-28 ASSESSMENT — PAIN SCALES - GENERAL: PAINLEVEL: 0-NO PAIN

## 2024-06-03 ENCOUNTER — INFUSION (OUTPATIENT)
Dept: HEMATOLOGY/ONCOLOGY | Facility: HOSPITAL | Age: 59
End: 2024-06-03
Payer: COMMERCIAL

## 2024-06-03 VITALS
SYSTOLIC BLOOD PRESSURE: 145 MMHG | RESPIRATION RATE: 18 BRPM | TEMPERATURE: 96.8 F | DIASTOLIC BLOOD PRESSURE: 76 MMHG | HEART RATE: 76 BPM | OXYGEN SATURATION: 95 %

## 2024-06-03 DIAGNOSIS — D50.9 IRON DEFICIENCY ANEMIA, UNSPECIFIED IRON DEFICIENCY ANEMIA TYPE: ICD-10-CM

## 2024-06-03 PROCEDURE — 2500000004 HC RX 250 GENERAL PHARMACY W/ HCPCS (ALT 636 FOR OP/ED)

## 2024-06-03 PROCEDURE — 96365 THER/PROPH/DIAG IV INF INIT: CPT | Mod: INF

## 2024-06-03 RX ORDER — FAMOTIDINE 10 MG/ML
20 INJECTION INTRAVENOUS ONCE AS NEEDED
OUTPATIENT
Start: 2024-06-04

## 2024-06-03 RX ORDER — DIPHENHYDRAMINE HYDROCHLORIDE 50 MG/ML
50 INJECTION INTRAMUSCULAR; INTRAVENOUS AS NEEDED
OUTPATIENT
Start: 2024-06-04

## 2024-06-03 RX ORDER — EPINEPHRINE 0.3 MG/.3ML
0.3 INJECTION SUBCUTANEOUS EVERY 5 MIN PRN
OUTPATIENT
Start: 2024-06-04

## 2024-06-03 RX ORDER — ALBUTEROL SULFATE 0.83 MG/ML
3 SOLUTION RESPIRATORY (INHALATION) AS NEEDED
OUTPATIENT
Start: 2024-06-04

## 2024-06-03 RX ADMIN — IRON SUCROSE 300 MG: 20 INJECTION, SOLUTION INTRAVENOUS at 13:15

## 2024-06-03 ASSESSMENT — PAIN SCALES - GENERAL: PAINLEVEL: 0-NO PAIN

## 2024-06-11 NOTE — PROGRESS NOTES
"Subjective   Patient ID: Maryuri Barrett is a 58 y.o. female who presents for med folllow up. Patient started on iron and vitamin d due to lab results. States that she is not taking the iron    Would like to discuss options to stop smoking  Today she is accompanied by alone.     Maryuri is here today for a follow-up appointment.  Her blood pressure today is 137/84.  She reports that she has been feeling well.  She would like to stop smoking. I have counseled pt about the need for tobacco cessation and how I can support his efforts when pt is ready to quit. Discussed about various nicotine replacement therapies as potential options.  We decided to try Chantix.    She also is complaining of insomnia.  She reports that she wakes up every night around 3 AM.  Sometimes she cannot go back to sleep.  She does use Unisom.  I would like to try doxepin at bedtime to see if it would help prolong her hours of sleep.    I will have her follow back up in 2 to 3 months.          Review of Systems   Constitutional: Negative.    HENT: Negative.     Respiratory: Negative.     Cardiovascular: Negative.    Gastrointestinal: Negative.    Genitourinary: Negative.    Musculoskeletal: Negative.    Skin: Negative.    Allergic/Immunologic: Negative.    Neurological: Negative.    Hematological: Negative.    Psychiatric/Behavioral:  Positive for sleep disturbance.        Objective   /84 (BP Location: Left arm)   Pulse 80   Temp 36.2 °C (97.1 °F)   Resp 16   Ht 1.6 m (5' 3\")   Wt 104 kg (230 lb 3.2 oz)   SpO2 96%   BMI 40.78 kg/m²   BSA: 2.15 meters squared  Growth percentiles: Facility age limit for growth %jaime is 20 years. Facility age limit for growth %jaime is 20 years.     Physical Exam  Vitals and nursing note reviewed.   Constitutional:       Appearance: Normal appearance. She is normal weight.   HENT:      Head: Normocephalic.      Nose: Nose normal.      Mouth/Throat:      Mouth: Mucous membranes are moist.      Pharynx: " "Oropharynx is clear.   Eyes:      Extraocular Movements: Extraocular movements intact.      Conjunctiva/sclera: Conjunctivae normal.      Pupils: Pupils are equal, round, and reactive to light.   Cardiovascular:      Rate and Rhythm: Normal rate and regular rhythm.      Pulses: Normal pulses.      Heart sounds: Normal heart sounds.   Pulmonary:      Effort: Pulmonary effort is normal.      Breath sounds: Normal breath sounds.   Abdominal:      General: Abdomen is flat. Bowel sounds are normal.      Palpations: Abdomen is soft.   Musculoskeletal:         General: Normal range of motion.      Cervical back: Normal range of motion and neck supple.   Skin:     General: Skin is warm and dry.      Capillary Refill: Capillary refill takes less than 2 seconds.   Neurological:      General: No focal deficit present.      Mental Status: She is alert. Mental status is at baseline.   Psychiatric:         Mood and Affect: Mood normal.         Behavior: Behavior normal.         Thought Content: Thought content normal.         Judgment: Judgment normal.     /84 (BP Location: Left arm)   Pulse 80   Temp 36.2 °C (97.1 °F)   Resp 16   Ht 1.6 m (5' 3\")   Wt 104 kg (230 lb 3.2 oz)   SpO2 96%   BMI 40.78 kg/m²    Lab Results   Component Value Date    GLUCOSE 103 (H) 05/13/2024    CALCIUM 9.8 05/13/2024     05/13/2024    K 4.3 05/13/2024    CO2 28 05/13/2024     05/13/2024    BUN 11 05/13/2024    CREATININE 0.67 05/13/2024        Assessment/Plan   Problem List Items Addressed This Visit    None    I spent >3 minutes but <10 minutes discussing smoking cessation with patient.    "

## 2024-06-12 ENCOUNTER — OFFICE VISIT (OUTPATIENT)
Dept: PRIMARY CARE | Facility: CLINIC | Age: 59
End: 2024-06-12
Payer: COMMERCIAL

## 2024-06-12 VITALS
WEIGHT: 230.2 LBS | TEMPERATURE: 97.1 F | OXYGEN SATURATION: 96 % | BODY MASS INDEX: 40.79 KG/M2 | SYSTOLIC BLOOD PRESSURE: 137 MMHG | HEIGHT: 63 IN | HEART RATE: 80 BPM | RESPIRATION RATE: 16 BRPM | DIASTOLIC BLOOD PRESSURE: 84 MMHG

## 2024-06-12 DIAGNOSIS — F17.200 READY TO QUIT SMOKING: ICD-10-CM

## 2024-06-12 DIAGNOSIS — F17.200 READY TO QUIT SMOKING: Primary | ICD-10-CM

## 2024-06-12 DIAGNOSIS — D50.9 IRON DEFICIENCY ANEMIA, UNSPECIFIED IRON DEFICIENCY ANEMIA TYPE: ICD-10-CM

## 2024-06-12 DIAGNOSIS — F51.01 PRIMARY INSOMNIA: ICD-10-CM

## 2024-06-12 PROCEDURE — 99213 OFFICE O/P EST LOW 20 MIN: CPT

## 2024-06-12 PROCEDURE — 4004F PT TOBACCO SCREEN RCVD TLK: CPT

## 2024-06-12 PROCEDURE — 3008F BODY MASS INDEX DOCD: CPT

## 2024-06-12 PROCEDURE — 99406 BEHAV CHNG SMOKING 3-10 MIN: CPT

## 2024-06-12 RX ORDER — DOXEPIN HYDROCHLORIDE 50 MG/1
50 CAPSULE ORAL NIGHTLY
Qty: 90 CAPSULE | Refills: 3 | Status: SHIPPED | OUTPATIENT
Start: 2024-06-12 | End: 2025-06-12

## 2024-06-12 RX ORDER — VARENICLINE TARTRATE 0.5 (11)-1
KIT ORAL 2 TIMES DAILY
Qty: 53 EACH | Refills: 1 | Status: SHIPPED | OUTPATIENT
Start: 2024-06-12 | End: 2024-06-13

## 2024-06-12 ASSESSMENT — ENCOUNTER SYMPTOMS
NEUROLOGICAL NEGATIVE: 1
HEMATOLOGIC/LYMPHATIC NEGATIVE: 1
GASTROINTESTINAL NEGATIVE: 1
MUSCULOSKELETAL NEGATIVE: 1
CONSTITUTIONAL NEGATIVE: 1
CARDIOVASCULAR NEGATIVE: 1
SLEEP DISTURBANCE: 1
ALLERGIC/IMMUNOLOGIC NEGATIVE: 1
RESPIRATORY NEGATIVE: 1

## 2024-06-12 ASSESSMENT — PATIENT HEALTH QUESTIONNAIRE - PHQ9
1. LITTLE INTEREST OR PLEASURE IN DOING THINGS: NOT AT ALL
SUM OF ALL RESPONSES TO PHQ9 QUESTIONS 1 AND 2: 0
2. FEELING DOWN, DEPRESSED OR HOPELESS: NOT AT ALL

## 2024-06-12 ASSESSMENT — PAIN SCALES - GENERAL: PAINLEVEL: 0-NO PAIN

## 2024-06-13 RX ORDER — VARENICLINE TARTRATE 0.5 (11)-1
KIT ORAL 2 TIMES DAILY
Qty: 53 EACH | Refills: 1 | Status: SHIPPED | OUTPATIENT
Start: 2024-06-13 | End: 2024-08-26

## 2024-06-13 NOTE — TELEPHONE ENCOUNTER
Pharmacist wants clarification as this is not the box directions for starter pack. If you would like to alter the directions to stay the same please remove the note to pharmacy.

## 2024-06-21 ENCOUNTER — TELEPHONE (OUTPATIENT)
Dept: PRIMARY CARE | Facility: CLINIC | Age: 59
End: 2024-06-21
Payer: COMMERCIAL

## 2024-06-21 DIAGNOSIS — F17.200 READY TO QUIT SMOKING: Primary | ICD-10-CM

## 2024-06-21 RX ORDER — BUPROPION HYDROCHLORIDE 150 MG/1
150 TABLET ORAL EVERY MORNING
Qty: 30 TABLET | Refills: 1 | Status: SHIPPED | OUTPATIENT
Start: 2024-06-21 | End: 2024-08-20

## 2024-06-21 NOTE — TELEPHONE ENCOUNTER
Pt called  in to report that she is experiencing an allergic reaction to newly ordered chantix starter box. She has been encouraged to stop immediately, it has been added to her allergy list and has been discontinued. She was encouraged to go directly to Urgent Care or nearest emergency room with any worsening signs of allergic reaction. Patient voices understanding. She is also requesting that something else be sent in order to aid her in the smoking cessation process.

## 2024-07-02 ENCOUNTER — OFFICE VISIT (OUTPATIENT)
Dept: PRIMARY CARE | Facility: CLINIC | Age: 59
End: 2024-07-02
Payer: COMMERCIAL

## 2024-07-02 VITALS
BODY MASS INDEX: 41.11 KG/M2 | WEIGHT: 232 LBS | SYSTOLIC BLOOD PRESSURE: 94 MMHG | HEIGHT: 63 IN | HEART RATE: 82 BPM | OXYGEN SATURATION: 93 % | DIASTOLIC BLOOD PRESSURE: 72 MMHG

## 2024-07-02 DIAGNOSIS — M77.8 LEFT ELBOW TENDONITIS: Primary | ICD-10-CM

## 2024-07-02 DIAGNOSIS — F17.200 READY TO QUIT SMOKING: ICD-10-CM

## 2024-07-02 PROCEDURE — 3008F BODY MASS INDEX DOCD: CPT

## 2024-07-02 PROCEDURE — 99213 OFFICE O/P EST LOW 20 MIN: CPT

## 2024-07-02 PROCEDURE — 4004F PT TOBACCO SCREEN RCVD TLK: CPT

## 2024-07-02 RX ORDER — METHYLPREDNISOLONE 4 MG/1
TABLET ORAL
Qty: 21 TABLET | Refills: 0 | Status: SHIPPED | OUTPATIENT
Start: 2024-07-02 | End: 2024-07-09

## 2024-07-02 RX ORDER — BUPROPION HYDROCHLORIDE 150 MG/1
150 TABLET, EXTENDED RELEASE ORAL 2 TIMES DAILY
Qty: 180 TABLET | Refills: 3 | Status: SHIPPED | OUTPATIENT
Start: 2024-07-02 | End: 2025-07-02

## 2024-07-02 RX ORDER — METHOCARBAMOL 500 MG/1
500 TABLET, FILM COATED ORAL 4 TIMES DAILY PRN
Qty: 40 TABLET | Refills: 1 | Status: SHIPPED | OUTPATIENT
Start: 2024-07-02 | End: 2024-08-31

## 2024-07-02 RX ORDER — VIT C/E/ZN/COPPR/LUTEIN/ZEAXAN 250MG-90MG
25 CAPSULE ORAL DAILY
COMMUNITY

## 2024-07-02 ASSESSMENT — ENCOUNTER SYMPTOMS
HEMATOLOGIC/LYMPHATIC NEGATIVE: 1
CONSTITUTIONAL NEGATIVE: 1
NEUROLOGICAL NEGATIVE: 1
RESPIRATORY NEGATIVE: 1
CARDIOVASCULAR NEGATIVE: 1
ALLERGIC/IMMUNOLOGIC NEGATIVE: 1
GASTROINTESTINAL NEGATIVE: 1
MUSCULOSKELETAL NEGATIVE: 1
PSYCHIATRIC NEGATIVE: 1

## 2024-07-02 ASSESSMENT — PATIENT HEALTH QUESTIONNAIRE - PHQ9
1. LITTLE INTEREST OR PLEASURE IN DOING THINGS: NOT AT ALL
2. FEELING DOWN, DEPRESSED OR HOPELESS: NOT AT ALL
SUM OF ALL RESPONSES TO PHQ9 QUESTIONS 1 AND 2: 0

## 2024-07-02 ASSESSMENT — PAIN SCALES - GENERAL: PAINLEVEL: 8

## 2024-07-02 NOTE — PROGRESS NOTES
"Subjective   Patient ID: Maryuri Barrett is a 58 y.o. female who presents for Pain (Left shoulder; more at the elbow and radiates upward towards shoulder. ). Pt used methocarbamol which brought a little relief. (It was her husbands)   Today she is accompanied by alone.     Maryuri is here today with complaints of left elbow pain that is radiating into her upper arm causing muscle spasms.  She reports that the pain has been getting worse over the last couple days and it feels like a \"toothache in her arm\" rating it a 7-8/10.  On examination she did have relief in the pain when compression was applied to the area just below the elbow.  No swelling of the bursa is noted.  We discussed tendinitis and that this likely represents tennis elbow.  I advised her to get a compression device for tendinitis which is available over-the-counter.  I will send her a Medrol Dosepak.  She was advised that she could use ibuprofen as needed as well as apply ice for comfort.        Review of Systems   Constitutional: Negative.    HENT: Negative.     Respiratory: Negative.     Cardiovascular: Negative.    Gastrointestinal: Negative.    Genitourinary: Negative.    Musculoskeletal: Negative.         Left elbow to shoulder pain   Skin: Negative.    Allergic/Immunologic: Negative.    Neurological: Negative.    Hematological: Negative.    Psychiatric/Behavioral: Negative.         Objective   BP 94/72 (BP Location: Right arm)   Pulse 82   Ht 1.6 m (5' 3\")   Wt 105 kg (232 lb)   SpO2 93%   BMI 41.10 kg/m²   BSA: 2.16 meters squared  Growth percentiles: Facility age limit for growth %jaime is 20 years. Facility age limit for growth %jaime is 20 years.     Physical Exam  Vitals and nursing note reviewed.   Constitutional:       Appearance: Normal appearance. She is normal weight.   HENT:      Head: Normocephalic.      Nose: Nose normal.      Mouth/Throat:      Mouth: Mucous membranes are moist.      Pharynx: Oropharynx is clear.   Eyes:      " "Extraocular Movements: Extraocular movements intact.      Conjunctiva/sclera: Conjunctivae normal.      Pupils: Pupils are equal, round, and reactive to light.   Cardiovascular:      Rate and Rhythm: Normal rate and regular rhythm.      Pulses: Normal pulses.      Heart sounds: Normal heart sounds.   Pulmonary:      Effort: Pulmonary effort is normal.      Breath sounds: Normal breath sounds.   Abdominal:      General: Abdomen is flat. Bowel sounds are normal.      Palpations: Abdomen is soft.   Musculoskeletal:         General: Tenderness (left elkbow) present. Normal range of motion.      Cervical back: Normal range of motion and neck supple.   Skin:     General: Skin is warm and dry.      Capillary Refill: Capillary refill takes less than 2 seconds.   Neurological:      General: No focal deficit present.      Mental Status: She is alert. Mental status is at baseline.   Psychiatric:         Mood and Affect: Mood normal.         Behavior: Behavior normal.         Thought Content: Thought content normal.         Judgment: Judgment normal.       BP 94/72 (BP Location: Right arm)   Pulse 82   Ht 1.6 m (5' 3\")   Wt 105 kg (232 lb)   SpO2 93%   BMI 41.10 kg/m²    Lab Results   Component Value Date    GLUCOSE 103 (H) 05/13/2024    CALCIUM 9.8 05/13/2024     05/13/2024    K 4.3 05/13/2024    CO2 28 05/13/2024     05/13/2024    BUN 11 05/13/2024    CREATININE 0.67 05/13/2024        Assessment/Plan   Problem List Items Addressed This Visit       Ready to quit smoking    Relevant Medications    buPROPion SR (Wellbutrin SR) 150 mg 12 hr tablet     Other Visit Diagnoses       Left elbow tendonitis    -  Primary    Relevant Medications    methocarbamol (Robaxin) 500 mg tablet    methylPREDNISolone (Medrol Dospak) 4 mg tablets        It was great to see you today!  Please continue taking your prescribed medications.   Refill have been sent to your pharmacy.    Please start Medrol dose pack and methocarbamol for " left elbow tendonitis    Follow up at previously scheduled appointment.  Use ibuprofen as needed for pain, apply ice, use compression device  Call office if not improving after 1 week

## 2024-07-02 NOTE — PATIENT INSTRUCTIONS
It was great to see you today!  Please continue taking your prescribed medications.   Refill have been sent to your pharmacy.    Please start Medrol dose pack and methocarbamol for left elbow tendonitis    Follow up at previously scheduled appointment.  Use ibuprofen as needed for pain, apply ice, use compression device  Call office if not improving after 1 week

## 2024-08-14 ENCOUNTER — LAB (OUTPATIENT)
Dept: LAB | Facility: LAB | Age: 59
End: 2024-08-14
Payer: COMMERCIAL

## 2024-08-14 DIAGNOSIS — D50.9 IRON DEFICIENCY ANEMIA, UNSPECIFIED IRON DEFICIENCY ANEMIA TYPE: ICD-10-CM

## 2024-08-14 LAB
ALBUMIN SERPL BCP-MCNC: 4.5 G/DL (ref 3.4–5)
ALP SERPL-CCNC: 90 U/L (ref 33–110)
ALT SERPL W P-5'-P-CCNC: 21 U/L (ref 7–45)
ANION GAP SERPL CALC-SCNC: 13 MMOL/L (ref 10–20)
AST SERPL W P-5'-P-CCNC: 19 U/L (ref 9–39)
BASOPHILS # BLD AUTO: 0.05 X10*3/UL (ref 0–0.1)
BASOPHILS NFR BLD AUTO: 0.6 %
BILIRUB SERPL-MCNC: 0.3 MG/DL (ref 0–1.2)
BUN SERPL-MCNC: 11 MG/DL (ref 6–23)
CALCIUM SERPL-MCNC: 9.6 MG/DL (ref 8.6–10.3)
CHLORIDE SERPL-SCNC: 101 MMOL/L (ref 98–107)
CO2 SERPL-SCNC: 28 MMOL/L (ref 21–32)
CREAT SERPL-MCNC: 0.68 MG/DL (ref 0.5–1.05)
EGFRCR SERPLBLD CKD-EPI 2021: >90 ML/MIN/1.73M*2
EOSINOPHIL # BLD AUTO: 0.06 X10*3/UL (ref 0–0.7)
EOSINOPHIL NFR BLD AUTO: 0.7 %
ERYTHROCYTE [DISTWIDTH] IN BLOOD BY AUTOMATED COUNT: 12.4 % (ref 11.5–14.5)
FERRITIN SERPL-MCNC: 205 NG/ML (ref 8–150)
GLUCOSE SERPL-MCNC: 97 MG/DL (ref 74–99)
HCT VFR BLD AUTO: 44.5 % (ref 36–46)
HGB BLD-MCNC: 15 G/DL (ref 12–16)
IMM GRANULOCYTES # BLD AUTO: 0.02 X10*3/UL (ref 0–0.7)
IMM GRANULOCYTES NFR BLD AUTO: 0.2 % (ref 0–0.9)
IRON SATN MFR SERPL: 21 % (ref 25–45)
IRON SERPL-MCNC: 84 UG/DL (ref 35–150)
LYMPHOCYTES # BLD AUTO: 1.89 X10*3/UL (ref 1.2–4.8)
LYMPHOCYTES NFR BLD AUTO: 22 %
MCH RBC QN AUTO: 31.8 PG (ref 26–34)
MCHC RBC AUTO-ENTMCNC: 33.7 G/DL (ref 32–36)
MCV RBC AUTO: 94 FL (ref 80–100)
MONOCYTES # BLD AUTO: 0.72 X10*3/UL (ref 0.1–1)
MONOCYTES NFR BLD AUTO: 8.4 %
NEUTROPHILS # BLD AUTO: 5.85 X10*3/UL (ref 1.2–7.7)
NEUTROPHILS NFR BLD AUTO: 68.1 %
NRBC BLD-RTO: 0 /100 WBCS (ref 0–0)
PLATELET # BLD AUTO: 211 X10*3/UL (ref 150–450)
POTASSIUM SERPL-SCNC: 4.3 MMOL/L (ref 3.5–5.3)
PROT SERPL-MCNC: 6.7 G/DL (ref 6.4–8.2)
RBC # BLD AUTO: 4.72 X10*6/UL (ref 4–5.2)
SODIUM SERPL-SCNC: 138 MMOL/L (ref 136–145)
TIBC SERPL-MCNC: 392 UG/DL (ref 240–445)
UIBC SERPL-MCNC: 308 UG/DL (ref 110–370)
WBC # BLD AUTO: 8.6 X10*3/UL (ref 4.4–11.3)

## 2024-08-14 PROCEDURE — 80053 COMPREHEN METABOLIC PANEL: CPT

## 2024-08-14 PROCEDURE — 85025 COMPLETE CBC W/AUTO DIFF WBC: CPT

## 2024-08-14 PROCEDURE — 36415 COLL VENOUS BLD VENIPUNCTURE: CPT

## 2024-08-14 PROCEDURE — 83550 IRON BINDING TEST: CPT

## 2024-08-14 PROCEDURE — 83540 ASSAY OF IRON: CPT

## 2024-08-14 PROCEDURE — 82607 VITAMIN B-12: CPT

## 2024-08-14 PROCEDURE — 82728 ASSAY OF FERRITIN: CPT

## 2024-08-15 LAB — VIT B12 SERPL-MCNC: 392 PG/ML (ref 211–911)

## 2024-08-16 ENCOUNTER — TELEPHONE (OUTPATIENT)
Dept: PRIMARY CARE | Facility: CLINIC | Age: 59
End: 2024-08-16

## 2024-08-16 ENCOUNTER — OFFICE VISIT (OUTPATIENT)
Dept: HEMATOLOGY/ONCOLOGY | Facility: HOSPITAL | Age: 59
End: 2024-08-16
Payer: COMMERCIAL

## 2024-08-16 VITALS
BODY MASS INDEX: 41.52 KG/M2 | OXYGEN SATURATION: 96 % | RESPIRATION RATE: 20 BRPM | SYSTOLIC BLOOD PRESSURE: 138 MMHG | DIASTOLIC BLOOD PRESSURE: 82 MMHG | TEMPERATURE: 98.4 F | HEART RATE: 102 BPM | WEIGHT: 234.4 LBS

## 2024-08-16 DIAGNOSIS — D50.9 IRON DEFICIENCY ANEMIA, UNSPECIFIED IRON DEFICIENCY ANEMIA TYPE: ICD-10-CM

## 2024-08-16 DIAGNOSIS — Z12.11 SCREENING FOR MALIGNANT NEOPLASM OF COLON: Primary | ICD-10-CM

## 2024-08-16 PROCEDURE — 99214 OFFICE O/P EST MOD 30 MIN: CPT

## 2024-08-16 RX ORDER — FERROUS SULFATE 300 MG/5ML
60 LIQUID (ML) ORAL DAILY
Qty: 150 ML | Refills: 3 | Status: SHIPPED | OUTPATIENT
Start: 2024-08-16

## 2024-08-16 ASSESSMENT — PAIN SCALES - GENERAL: PAINLEVEL: 0-NO PAIN

## 2024-08-16 ASSESSMENT — ENCOUNTER SYMPTOMS
OCCASIONAL FEELINGS OF UNSTEADINESS: 0
LOSS OF SENSATION IN FEET: 0
DEPRESSION: 0

## 2024-08-16 NOTE — PROGRESS NOTES
[facility]    PATIENT VISIT INFORMATION    Visit Type: Follow up    Referring Provider: JASON Ritchie  Reason for referral: Iron deficiency  Primary Practice Provider: JASON Ritchie    HEMATOLOGY HISTORY    58 year old female stated she has been feeling tired for many years and just started to see MD recently. in 8/2021 she was found to have low hgb 8.1 with low MCV 65. she has nl wbc and plt. she denies bleeding. workup of anemia showed borderline vit B12, neg FOBR (per pt)  and nl TSH. pt stated she never had colonoscopy, was started on monthly vit B12 injection. was started on iron but difficult to compolaint with taking pills. stated drinks whisky 2-3 drinks daily and smokes 1 pack cig daily.    She had a negative FOBT in 8/2021, it was not indicated that she needed iron infusions.   of note, pt had bariatric surgery 2009 but never take any supplement     s/p 5 iron infusion with iprovement in energy. on vit B12 injection monthly     Menopause 5 years ago.    S/p last iron infusion June 2024 venofer x 3 doses.     HISTORY OF PRESENT ILLNESS     ID Statement: Maryuri Barrett is a 58 years old    Chief Complaint: Iron Deficiency     Interval History:   Patient presents for a follow-up. Would like to try oral iron.   On assessment, she is fatigued but it is better. Appetite is good. Denies fever, chills, night sweats, decreased appetite or wt loss. Denies CP, SOB except with exertion, no N/V/C/D, no blood in stool, or abd pain or urinary symptoms.   She notes a vaginal yeast infection with itching, redness and discomfort. Continues.  Five or 6 years last gyn exam. Has not followed up with Gyn. Muscle cramping improved. No PICA, though in the past.   Reports a Cologuard 3 years ago. Due now. No colonoscopy.    Right hand tingling, better, diagnosed with tendonitis. Bruises easily. No bleeding from any location. No bone pain. No  "lymphadenopathy. No rashes other than vaginal yeast infection she has been dealing with for over a year. She was treated before with antifungal. No endocrine disorders, no depression, not sexually active.     PAST/CURRENT HISTORY     MEDICAL/SURGICAL HISTORY  -Anemia as above  -Bariatric surgery in 2009 [id number]  -Allergies, seasonal   -acute sinusitis   -Dyslipidemia  -cataracts eyes  -vulvovaginal candidiasis   -breast lesion and asymmetry  -JOS  -Pernicious anemia   -infection of skin  -insomnia  -nicotine dependent     SOCIAL HISTORY  -Lives with  one adult healthy child   -Work place [employer] from Home  -Tobacco/smokeless use: 1 pack per day (HX 40 year smoker 1 pack per day)  -Alcohol: Two mixed drinks shots per day   -Illicit drug or marijuana use: Denies   -Gnosticist or Spiritual beliefs: None reported   -Social Determinates of Health Concerns: none reported    FAMILY HISTORY  -sister JOS gastric bypass  -Mom lung cancer and smoker  -No other known history of hematologic, bleeding, clotting, autoimmune, genetic, or malignant disorders in the family.     OCCUPATIONAL/ENVIRONMENTAL HISTORY/EXPOSURES:  -None reported    Active Problems, Allergy List, Medication List, and PMH/PSH/FH/Social Hx have been reviewed and reconciled in chart. Updates made when necessary.     REVIEW OF SYSTEMS   A review of systems has been completed and are negative for complaints except what is stated in the assessment, HPI, IH, ROS, and/or past medical history.    ALLERGIES AND MEDICATIONS     Allergies and Intolerances:   Allergies   Allergen Reactions    Penicillins Anaphylaxis    Chantix [Varenicline] Swelling    Guaifenesin GI Upset     Intense \"stomach pain\"    Sulfa (Sulfonamide Antibiotics) Swelling      Medication Profile:   Current Outpatient Medications   Medication Instructions    cholecalciferol (VITAMIN D3) 25 mcg, oral, Daily, Pt takes the supplement containing vit k    doxepin (SINEQUAN) 50 mg, oral, " "Nightly    methocarbamol (ROBAXIN) 500 mg, oral, 4 times daily PRN    multivitamin tablet 1 tablet, oral, Daily      Available Vaccination Record:   Immunization History   Administered Date(s) Administered    Flu vaccine (IIV4), preservative free *Check age/dose* 10/19/2017, 09/28/2018, 12/15/2019, 11/27/2020, 01/09/2022, 12/19/2022, 01/05/2024    Flu vaccine, trivalent, preservative free, age 6 months and greater (Fluarix/Fluzone/Flulaval) 10/01/2014, 10/29/2015    Hepatitis B vaccine, adult *Check Product/Dose* 05/30/2017, 08/17/2017, 12/22/2017    Influenza, seasonal, injectable 09/30/2013    MMR vaccine, subcutaneous (MMR II) 05/30/2017    Pfizer COVID-19 vaccine, Fall 2023, 12 years and older, (30mcg/0.3mL) 01/05/2024    Pfizer Purple Cap SARS-CoV-2 03/16/2021, 04/13/2021, 01/09/2022    Tdap vaccine, age 7 year and older (BOOSTRIX, ADACEL) 05/30/2017      PHYSICAL EXAM     Vital Signs/Measurements:       5/28/2024     2:58 PM 6/3/2024    12:55 PM 6/3/2024     2:45 PM 6/3/2024     3:11 PM 6/12/2024     3:08 PM 7/2/2024     3:23 PM 8/16/2024     2:18 PM   Vitals   Systolic 152 132 141 145 137 94 138   Diastolic 92 71 75 76 84 72 82   Heart Rate 68 83 78 76 80 82 102   Temp 36.3 °C (97.3 °F) 36.4 °C (97.5 °F) 36 °C (96.8 °F) 36 °C (96.8 °F) 36.2 °C (97.1 °F)  36.9 °C (98.4 °F)   Resp 18 18 18 18 16  20   Height (in)     1.6 m (5' 3\") 1.6 m (5' 3\")    Weight (lb)     230.2 232 234.4   BMI     40.78 kg/m2 41.1 kg/m2 41.52 kg/m2   BSA (m2)     2.15 m2 2.16 m2 2.17 m2   Visit Report     Report Report Report      Performance:   ECOG Performance Status: 0     Grade ECOG performance status   0 Fully active, able to carry on all pre-disease performance without restriction   1 Restricted in physically strenuous activity but ambulatory and able to carry out work of a light or sedentary nature, e.g., light housework, office work   2 Ambulatory and capable of all selfcare but unable to carry out any work activities; Up and " about more than 50% of waking hours   3 Capable of only limited selfcare, confined to bed or chair more than 50% of waking hours   4 Completely disabled; Cannot carry out any selfcare; Totally confined to bed or chair   5 Dead     Physical Exam:  General: Patient is awake/alert/oriented x3, no distress, Nourished, hydrated, alert and cooperative, ambulating without difficulty  Skin: Expected color for ethnicity, good turgor, dry, no prominent lesions, rashes, unusual bruising, or bleeding   Hair: Normal texture and distribution   Nails: Normal color, no deformities    HEENT:   Head: Normocephalic, atraumatic, no visible or palpable masses, depressions, or scarring   Eyes: Conjunctiva clear, sclera non-icteric, PERRL, EOMI, no exudates or hemorrhages   Ears: nl appearance, hearing intact    Nose: no external lesions, mucosa non-inflamed, no rhinorrhea   Mouth: Mucous membranes moist, no lesions, sores, bleeding, or erythema     Head/Neck: Neck supple, no apparent injury, thyroid without mass or tenderness, No JVD, trachea midline, no bruits appreciated    Respiratory/Thorax: Patent airways, CTAB, chest symmetry, normal inspiratory and expiratory effort    Cardiovascular: Regular rate and rhythm, no murmur or gallop, no carotid bruit or thrills    Gastrointestinal: Bowel sounds normal, non-distended, soft, no tenderness, no masses or hernia, or organomegaly appreciated    Genitourinary: deferred   Musculoskeletal: Normal gait, normal range of motion, no pain on palpation of spine, no deformity, normal strength for baseline, no atrophy, and no CVA tenderness appreciated   Extremities: No amputations or deformities, cyanosis, edema or viscosities, peripheral pulses intact   Neurological: Sensation present to touch, intact senses, motor response and reflexes normal, normal strength   Breast:    Lymphatic: No significant lymphadenopathy   Psychological: Intact recent and remote memory, judgement, and insight. Appropriate  mood, affect, and behavior          RESULTS/DATA     Labs:     Lab Results   Component Value Date    WBC 8.6 08/14/2024    NEUTROABS 5.85 08/14/2024    IGABSOL 0.02 08/14/2024    LYMPHSABS 1.89 08/14/2024    MONOSABS 0.72 08/14/2024    EOSABS 0.06 08/14/2024    BASOSABS 0.05 08/14/2024    RBC 4.72 08/14/2024    MCV 94 08/14/2024    MCHC 33.7 08/14/2024    HGB 15.0 08/14/2024    HCT 44.5 08/14/2024     08/14/2024     Lab Results   Component Value Date    RETICCTPCT 1.0 05/13/2024      Lab Results   Component Value Date    CREATININE 0.68 08/14/2024    BUN 11 08/14/2024    EGFR >90 08/14/2024     08/14/2024    K 4.3 08/14/2024     08/14/2024    CO2 28 08/14/2024      Lab Results   Component Value Date    ALT 21 08/14/2024    AST 19 08/14/2024    ALKPHOS 90 08/14/2024    BILITOT 0.3 08/14/2024       Lab Results   Component Value Date    TSH  03/12/2024      Comment:      [facility] auto verified result due to EDTA pour off. Result not valid.  Corrected result: Previously reported as 3.19 mIU/L (reference range: 0.44-3.98 mIU/L) on 3/12/2024 at 2301 EDT.     Lab Results   Component Value Date    IRON 84 08/14/2024    TIBC 392 08/14/2024    FERRITIN 205 (H) 08/14/2024      Lab Results   Component Value Date    USKXGZXV01 392 08/14/2024      Lab Results   Component Value Date    FOLATE 17.0 03/12/2024     Lab Results   Component Value Date    KALA Negative 03/12/2024    SEDRATE 3 05/13/2024      Lab Results   Component Value Date    CRP 0.40 05/13/2024        Radiology/Studies:   US ABDOMEN COMPLETE   3/2018  IMPRESSION:   Mild fatty liver.   No acute findings.     ASSESSMENT/PLAN     Assessment and Plan:   #1. JOS   57 yo female presented with microcytic anemia with iron def anemia, borderline vit B12 def likely due to malabsorption due to bariatric surgery in 2009 not compliant  with oral iron supplementation, resolved with IV iron infusions.      No evidence of bleeding and per pt FOBT neg in 8/2021 via  PCP, no colonoscopy in the past.  Discussed with the patient in detail regarding diagnosis etiology of anemia.  SPEP drawn.  No elevation in inflammatory markers.  And coagulation and reticulate are normal.  Additionally, smoking cessation and alcohol cessation discussed.  Discussed IV iron infusions as needed.  Monitor labs for 6 weeks following last infusion.  Follow-up 3 months with labs.    Discussed following up with GI for colonoscopy if iron deficiency persists.    Iron deficiency labs noted March 2024 and again 5/13/2024.  The Venofer has been ordered.    8/16/2024 s/p iron June 2024. Would like oral iron. Still slightly deficient. She would like oral liquid and tryand see if she absorbs. She does have malabsorption and we discussed this and will try and see. If tolerates and shows repletion of iron patient may transition care back to PCP. Encouraged colon cancer screening.     Mammogram recommend 6 month follow up. Probably benign bilateral mammographic and sonographic findings  described above should be followed with diagnostic mammography.     #2. Vaginal Candidiasis    Patient complaint of vaginal yeast infection.  She had seen her primary and did not mention.  Provided 2 doses of Diflucan.  Advised to follow-up with her primary.  She has not had a vaginal exam in over 6 years.      Persists. Again, Advised she follow-up with gynecology.        **Please follow with specialties as scheduled for other comorbidities and routine health screenings.**    I have reviewed the patient's medical record including provider notes, laboratory and testing results, imaging, and procedures available within the system and outside the system pertinent to patient care.     Follow up:    RTC:  -3 months with labs    Medications:  -Ferrous  sulfate oral iron 300 mg/5 ml please take 5 ml every day     Imaging/Testing:  -N/A    Referral:  -PCP and gynecology    Other Pertinent Appointments:  -Primary care as needed      Patient  Discussion Summary:  Discussed plan of care in detail. Patient states understanding and in agreement. Answered all questions. They will call with any additional questions and/or concerns.     Thank you for allowing me to participate in your care.     Sincerely,  ASUNCION Hanna-CNP       This document may have been written by voice recognition software.  Please request clarification if there is documentation error or clarification is needed.   Time based billing: Please see documentation within this specific encounter.

## 2024-08-16 NOTE — LETTER
August 16, 2024     JASON [place]  [address]  [address]  [facility] [address]    Patient: Maryuri Barrett   YOB: 1965   Date of Visit: 8/16/2024       Dear JASON Alvarez:  Advised she have colon cancer screening. She would like to try oral liquid iron. [facility].  Advised may not absorb. Follow in 3 months, if she remains on oral she can transition care back to you.   If you have questions, please do not hesitate to call me. I look forward to following your patient along with you.       Sincerely,     JASON Juarez      CC: No Recipients  ______________________________________________________________________________________    [facility]    PATIENT VISIT INFORMATION    Visit Type: Follow up    Referring Provider: JASON Ritchie  Reason for referral: Iron deficiency  Primary Practice Provider: JASON Ritchie    HEMATOLOGY HISTORY    58 year old female stated she has been feeling tired for many years and just started to see MD recently. in 8/2021 she was found to have low hgb 8.1 with low MCV 65. she has nl wbc and plt. she denies bleeding. workup of anemia showed borderline vit B12, neg FOBR (per pt)  and nl TSH. pt stated she never had colonoscopy, was started on monthly vit B12 injection. was started on iron but difficult to compolaint with taking pills. stated drinks whisky 2-3 drinks daily and smokes 1 pack cig daily.    She had a negative FOBT in 8/2021, it was not indicated that she needed iron infusions.   of note, pt had bariatric surgery 2009 but never take any supplement     s/p 5 iron infusion with iprovement in energy. on vit B12 injection monthly     Menopause 5 years ago.    S/p last iron infusion June 2024 venofer x 3 doses.     HISTORY OF PRESENT ILLNESS     ID Statement: Maryuri Barrett is a 58 years old    Chief Complaint: Iron Deficiency      Interval History:   Patient presents for a follow-up. Would like to try oral iron.   On assessment, she is fatigued but it is better. Appetite is good. Denies fever, chills, night sweats, decreased appetite or wt loss. Denies CP, SOB except with exertion, no N/V/C/D, no blood in stool, or abd pain or urinary symptoms.   She notes a vaginal yeast infection with itching, redness and discomfort. Continues.  Five or 6 years last gyn exam. Has not followed up with Gyn. Muscle cramping improved. No PICA, though in the past.   Reports a Cologuard 3 years ago. Due now. No colonoscopy.    Right hand tingling, better, diagnosed with tendonitis. Bruises easily. No bleeding from any location. No bone pain. No lymphadenopathy. No rashes other than vaginal yeast infection she has been dealing with for over a year. She was treated before with antifungal. No endocrine disorders, no depression, not sexually active.     PAST/CURRENT HISTORY     MEDICAL/SURGICAL HISTORY  -Anemia as above  -Bariatric surgery in 2009 [id number]  -Allergies, seasonal   -acute sinusitis   -Dyslipidemia  -cataracts eyes  -vulvovaginal candidiasis   -breast lesion and asymmetry  -JOS  -Pernicious anemia   -infection of skin  -insomnia  -nicotine dependent     SOCIAL HISTORY  -Lives with  one adult healthy child   -[employer] from Home  -Tobacco/smokeless use: 1 pack per day (HX 40 year smoker 1 pack per day)  -Alcohol: Two mixed drinks shots per day   -Illicit drug or marijuana use: Denies   -Gnosticism or Spiritual beliefs: None reported   -Social Determinates of Health Concerns: none reported    FAMILY HISTORY  -sister JOS gastric bypass  -Mom lung cancer and smoker  -No other known history of hematologic, bleeding, clotting, autoimmune, genetic, or malignant disorders in the family.     OCCUPATIONAL/ENVIRONMENTAL HISTORY/EXPOSURES:  -None reported    Active Problems, Allergy List, Medication List, and PMH/PSH/FH/Social Hx have  "been reviewed and reconciled in chart. Updates made when necessary.     REVIEW OF SYSTEMS   A review of systems has been completed and are negative for complaints except what is stated in the assessment, HPI, IH, ROS, and/or past medical history.    ALLERGIES AND MEDICATIONS     Allergies and Intolerances:   Allergies   Allergen Reactions   • Penicillins Anaphylaxis   • Chantix [Varenicline] Swelling   • Guaifenesin GI Upset     Intense \"stomach pain\"   • Sulfa (Sulfonamide Antibiotics) Swelling      Medication Profile:   Current Outpatient Medications   Medication Instructions   • cholecalciferol (VITAMIN D3) 25 mcg, oral, Daily, Pt takes the supplement containing vit k   • doxepin (SINEQUAN) 50 mg, oral, Nightly   • methocarbamol (ROBAXIN) 500 mg, oral, 4 times daily PRN   • multivitamin tablet 1 tablet, oral, Daily      Available Vaccination Record:   Immunization History   Administered Date(s) Administered   • Flu vaccine (IIV4), preservative free *Check age/dose* 10/19/2017, 09/28/2018, 12/15/2019, 11/27/2020, 01/09/2022, 12/19/2022, 01/05/2024   • Flu vaccine, trivalent, preservative free, age 6 months and greater (Fluarix/Fluzone/Flulaval) 10/01/2014, 10/29/2015   • Hepatitis B vaccine, adult *Check Product/Dose* 05/30/2017, 08/17/2017, 12/22/2017   • Influenza, seasonal, injectable 09/30/2013   • MMR vaccine, subcutaneous (MMR II) 05/30/2017   • Pfizer COVID-19 vaccine, Fall 2023, 12 years and older, (30mcg/0.3mL) 01/05/2024   • Pfizer Purple Cap SARS-CoV-2 03/16/2021, 04/13/2021, 01/09/2022   • Tdap vaccine, age 7 year and older (BOOSTRIX, ADACEL) 05/30/2017      PHYSICAL EXAM     Vital Signs/Measurements:       5/28/2024     2:58 PM 6/3/2024    12:55 PM 6/3/2024     2:45 PM 6/3/2024     3:11 PM 6/12/2024     3:08 PM 7/2/2024     3:23 PM 8/16/2024     2:18 PM   Vitals   Systolic 152 132 141 145 137 94 138   Diastolic 92 71 75 76 84 72 82   Heart Rate 68 83 78 76 80 82 102   Temp 36.3 °C (97.3 °F) 36.4 °C " "(97.5 °F) 36 °C (96.8 °F) 36 °C (96.8 °F) 36.2 °C (97.1 °F)  36.9 °C (98.4 °F)   Resp 18 18 18 18 16  20   Height (in)     1.6 m (5' 3\") 1.6 m (5' 3\")    Weight (lb)     230.2 232 234.4   BMI     40.78 kg/m2 41.1 kg/m2 41.52 kg/m2   BSA (m2)     2.15 m2 2.16 m2 2.17 m2   Visit Report     Report Report Report      Performance:   ECOG Performance Status: 0     Grade ECOG performance status   0 Fully active, able to carry on all pre-disease performance without restriction   1 Restricted in physically strenuous activity but ambulatory and able to carry out work of a light or sedentary nature, e.g., light housework, office work   2 Ambulatory and capable of all selfcare but unable to carry out any work activities; Up and about more than 50% of waking hours   3 Capable of only limited selfcare, confined to bed or chair more than 50% of waking hours   4 Completely disabled; Cannot carry out any selfcare; Totally confined to bed or chair   5 Dead     Physical Exam:  General: Patient is awake/alert/oriented x3, no distress, Nourished, hydrated, alert and cooperative, ambulating without difficulty  Skin: Expected color for ethnicity, good turgor, dry, no prominent lesions, rashes, unusual bruising, or bleeding   Hair: Normal texture and distribution   Nails: Normal color, no deformities    HEENT:   Head: Normocephalic, atraumatic, no visible or palpable masses, depressions, or scarring   Eyes: Conjunctiva clear, sclera non-icteric, PERRL, EOMI, no exudates or hemorrhages   Ears: nl appearance, hearing intact    Nose: no external lesions, mucosa non-inflamed, no rhinorrhea   Mouth: Mucous membranes moist, no lesions, sores, bleeding, or erythema     Head/Neck: Neck supple, no apparent injury, thyroid without mass or tenderness, No JVD, trachea midline, no bruits appreciated    Respiratory/Thorax: Patent airways, CTAB, chest symmetry, normal inspiratory and expiratory effort    Cardiovascular: Regular rate and rhythm, no murmur " or gallop, no carotid bruit or thrills    Gastrointestinal: Bowel sounds normal, non-distended, soft, no tenderness, no masses or hernia, or organomegaly appreciated    Genitourinary: deferred   Musculoskeletal: Normal gait, normal range of motion, no pain on palpation of spine, no deformity, normal strength for baseline, no atrophy, and no CVA tenderness appreciated   Extremities: No amputations or deformities, cyanosis, edema or viscosities, peripheral pulses intact   Neurological: Sensation present to touch, intact senses, motor response and reflexes normal, normal strength   Breast:    Lymphatic: No significant lymphadenopathy   Psychological: Intact recent and remote memory, judgement, and insight. Appropriate mood, affect, and behavior          RESULTS/DATA     Labs:     Lab Results   Component Value Date    WBC 8.6 08/14/2024    NEUTROABS 5.85 08/14/2024    IGABSOL 0.02 08/14/2024    LYMPHSABS 1.89 08/14/2024    MONOSABS 0.72 08/14/2024    EOSABS 0.06 08/14/2024    BASOSABS 0.05 08/14/2024    RBC 4.72 08/14/2024    MCV 94 08/14/2024    MCHC 33.7 08/14/2024    HGB 15.0 08/14/2024    HCT 44.5 08/14/2024     08/14/2024     Lab Results   Component Value Date    RETICCTPCT 1.0 05/13/2024      Lab Results   Component Value Date    CREATININE 0.68 08/14/2024    BUN 11 08/14/2024    EGFR >90 08/14/2024     08/14/2024    K 4.3 08/14/2024     08/14/2024    CO2 28 08/14/2024      Lab Results   Component Value Date    ALT 21 08/14/2024    AST 19 08/14/2024    ALKPHOS 90 08/14/2024    BILITOT 0.3 08/14/2024       Lab Results   Component Value Date    TSH  03/12/2024      Comment:      Canx auto verified result due to EDTA pour off. Result not valid.  Corrected result: Previously reported as 3.19 mIU/L (reference range: 0.44-3.98 mIU/L) on 3/12/2024 at 2301 EDT.     Lab Results   Component Value Date    IRON 84 08/14/2024    TIBC 392 08/14/2024    FERRITIN 205 (H) 08/14/2024      Lab Results   Component  Value Date    NXMYJIDF23 392 08/14/2024      Lab Results   Component Value Date    FOLATE 17.0 03/12/2024     Lab Results   Component Value Date    KALA Negative 03/12/2024    SEDRATE 3 05/13/2024      Lab Results   Component Value Date    CRP 0.40 05/13/2024        Radiology/Studies:   US ABDOMEN COMPLETE   3/2018  IMPRESSION:   Mild fatty liver.   No acute findings.     ASSESSMENT/PLAN     Assessment and Plan:   #1. JOS   59 yo female presented with microcytic anemia with iron def anemia, borderline vit B12 def likely due to malabsorption due to bariatric surgery in 2009 not compliant  with oral iron supplementation, resolved with IV iron infusions.      No evidence of bleeding and per pt FOBT neg in 8/2021 via PCP, no colonoscopy in the past.  Discussed with the patient in detail regarding diagnosis etiology of anemia.  SPEP drawn.  No elevation in inflammatory markers.  And coagulation and reticulate are normal.  Additionally, smoking cessation and alcohol cessation discussed.  Discussed IV iron infusions as needed.  Monitor labs for 6 weeks following last infusion.  Follow-up 3 months with labs.    Discussed following up with GI for colonoscopy if iron deficiency persists.    Iron deficiency labs noted March 2024 and again 5/13/2024.  The Venofer has been ordered.    8/16/2024 s/p iron June 2024. Would like oral iron. Still slightly deficient. She would like oral liquid and tryand see if she absorbs. She does have malabsorption and we discussed this and will try and see. Encouraged colon cancer screening.     Mammogram recommend 6 month follow up. Probably benign bilateral mammographic and sonographic findings  described above should be followed with diagnostic mammography.     #2. Vaginal Candidiasis    Patient complaint of vaginal yeast infection.  She had seen her primary and did not mention.  Provided 2 doses of Diflucan.  Advised to follow-up with her primary.  She has not had a vaginal exam in over 6  years.      Persists. Again, Advised she follow-up with gynecology.        **Please follow with specialties as scheduled for other comorbidities and routine health screenings.**    I have reviewed the patient's medical record including provider notes, laboratory and testing results, imaging, and procedures available within the system and outside the system pertinent to patient care.     Follow up:    RTC:  -3 months with labs    Medications:  -Ferrous  sulfate oral iron 300 mg/5 ml please take 5 ml every day     Imaging/Testing:  -N/A    Referral:  -PCP and gynecology    Other Pertinent Appointments:  -Primary care as needed      Patient Discussion Summary:  Discussed plan of care in detail. Patient states understanding and in agreement. Answered all questions. They will call with any additional questions and/or concerns.     Thank you for allowing me to participate in your care.     Sincerely,  Nupur Biswas, APRN-CNP       This document may have been written by voice recognition software.  Please request clarification if there is documentation error or clarification is needed.   Time based billing: Please see documentation within this specific encounter.

## 2024-08-19 DIAGNOSIS — Z12.11 SCREENING FOR MALIGNANT NEOPLASM OF COLON: ICD-10-CM

## 2024-08-22 ENCOUNTER — TELEPHONE (OUTPATIENT)
Dept: HEMATOLOGY/ONCOLOGY | Facility: HOSPITAL | Age: 59
End: 2024-08-22
Payer: COMMERCIAL

## 2024-08-22 NOTE — TELEPHONE ENCOUNTER
Contacted patient to let her know her insurance does not cover the liquid iron that was prescribed. Instructed patient to buy the iron 60mg/5ml OTC. Told to ask the pharmacist for assistance, if needed, in finding the correct dose.

## 2024-09-02 LAB — NONINV COLON CA DNA+OCC BLD SCRN STL QL: NEGATIVE

## 2024-10-12 ENCOUNTER — APPOINTMENT (OUTPATIENT)
Dept: OBSTETRICS AND GYNECOLOGY | Facility: CLINIC | Age: 59
End: 2024-10-12
Payer: COMMERCIAL

## 2024-10-12 VITALS
WEIGHT: 233 LBS | HEART RATE: 101 BPM | SYSTOLIC BLOOD PRESSURE: 149 MMHG | HEIGHT: 64 IN | DIASTOLIC BLOOD PRESSURE: 89 MMHG | BODY MASS INDEX: 39.78 KG/M2 | TEMPERATURE: 98 F

## 2024-10-12 DIAGNOSIS — N90.89 VULVAR IRRITATION: ICD-10-CM

## 2024-10-12 DIAGNOSIS — N89.8 VAGINAL ITCHING: Primary | ICD-10-CM

## 2024-10-12 PROCEDURE — 3008F BODY MASS INDEX DOCD: CPT | Performed by: MIDWIFE

## 2024-10-12 PROCEDURE — 87205 SMEAR GRAM STAIN: CPT

## 2024-10-12 PROCEDURE — 4004F PT TOBACCO SCREEN RCVD TLK: CPT | Performed by: MIDWIFE

## 2024-10-12 PROCEDURE — 99214 OFFICE O/P EST MOD 30 MIN: CPT | Performed by: MIDWIFE

## 2024-10-12 RX ORDER — CLOTRIMAZOLE AND BETAMETHASONE DIPROPIONATE 10; .64 MG/G; MG/G
CREAM TOPICAL
Qty: 30 G | Refills: 1 | Status: SHIPPED | OUTPATIENT
Start: 2024-10-12

## 2024-10-12 NOTE — PROGRESS NOTES
"Subjective   Patient ID: Maryuri Barrett is a 58 y.o. female who presents for vulvovaginal itch that is mostly vulvar and has been going on for the past 3 yrs.  Pt was txed 2021 for VVC but she says she got no relief.  Pt says sx are much worse when she is \"warm\" at night or in summer weather.    HPI  PMHx: last here 10/2021 for tx of VVC; no known h/o HTN  SocHx:  13 yrs  ROS: no pelvic pain, no urinary c/o, NAD  Review of Systems    Objective   Physical Exam  Constitutional:       Appearance: She is obese.   HENT:      Head: Normocephalic.   Pulmonary:      Effort: Pulmonary effort is normal.   Genitourinary:     Vagina: Vaginal discharge present.      Comments: Vulvar erythema and scant white discharge      Neurological:      Mental Status: She is alert.   Psychiatric:         Behavior: Behavior normal.         Thought Content: Thought content normal.         Assessment/Plan   Diagnoses and all orders for this visit:  Vaginal itching  -     Vaginitis Gram Stain For Bacterial Vaginosis + Yeast  Vulvar irritation  -     clotrimazole-betamethasone (Lotrisone) cream; Apply small amount of cream to affected area twice a day for 10 days.       Exam findings and vulvovaginal care discussed.   Pt to RTO for FU and AE in 3 mos  Pt is advised to FU with PCP for eval of BP      RHYS Sims, ND 10/12/24 8:55 AM   "

## 2024-10-13 LAB
BACTERIAL VAGINOSIS VAG-IMP: ABNORMAL
CLUE CELLS VAG LPF-#/AREA: PRESENT /[LPF]
NUGENT SCORE: 6
YEAST VAG WET PREP-#/AREA: ABNORMAL

## 2024-10-14 DIAGNOSIS — N76.0 BACTERIAL VAGINOSIS: Primary | ICD-10-CM

## 2024-10-14 DIAGNOSIS — B96.89 BACTERIAL VAGINOSIS: Primary | ICD-10-CM

## 2024-10-14 RX ORDER — METRONIDAZOLE 500 MG/1
500 TABLET ORAL 2 TIMES DAILY
Qty: 14 TABLET | Refills: 0 | Status: SHIPPED | OUTPATIENT
Start: 2024-10-14 | End: 2024-10-21

## 2024-11-06 ENCOUNTER — OFFICE VISIT (OUTPATIENT)
Dept: PRIMARY CARE | Facility: CLINIC | Age: 59
End: 2024-11-06
Payer: COMMERCIAL

## 2024-11-06 VITALS
HEART RATE: 85 BPM | HEIGHT: 64 IN | OXYGEN SATURATION: 94 % | SYSTOLIC BLOOD PRESSURE: 139 MMHG | WEIGHT: 233 LBS | DIASTOLIC BLOOD PRESSURE: 87 MMHG | BODY MASS INDEX: 39.78 KG/M2

## 2024-11-06 DIAGNOSIS — J40 BRONCHITIS: Primary | ICD-10-CM

## 2024-11-06 DIAGNOSIS — F17.200 SMOKER: ICD-10-CM

## 2024-11-06 PROCEDURE — 3008F BODY MASS INDEX DOCD: CPT

## 2024-11-06 PROCEDURE — 99213 OFFICE O/P EST LOW 20 MIN: CPT

## 2024-11-06 PROCEDURE — 4004F PT TOBACCO SCREEN RCVD TLK: CPT

## 2024-11-06 RX ORDER — AZITHROMYCIN 500 MG/1
500 TABLET, FILM COATED ORAL DAILY
Qty: 5 TABLET | Refills: 0 | Status: SHIPPED | OUTPATIENT
Start: 2024-11-06 | End: 2024-11-11

## 2024-11-06 RX ORDER — ALBUTEROL SULFATE 90 UG/1
2 INHALANT RESPIRATORY (INHALATION) EVERY 4 HOURS PRN
Qty: 8 G | Refills: 5 | Status: SHIPPED | OUTPATIENT
Start: 2024-11-06 | End: 2025-11-06

## 2024-11-06 RX ORDER — METHYLPREDNISOLONE 4 MG/1
TABLET ORAL
Qty: 21 TABLET | Refills: 0 | Status: SHIPPED | OUTPATIENT
Start: 2024-11-06 | End: 2024-11-13

## 2024-11-06 RX ORDER — BENZONATATE 200 MG/1
200 CAPSULE ORAL 3 TIMES DAILY PRN
Qty: 42 CAPSULE | Refills: 1 | Status: SHIPPED | OUTPATIENT
Start: 2024-11-06 | End: 2024-12-06

## 2024-11-06 ASSESSMENT — ENCOUNTER SYMPTOMS
FEVER: 0
HEADACHES: 0
MUSCULOSKELETAL NEGATIVE: 1
SWEATS: 0
WEIGHT LOSS: 0
GASTROINTESTINAL NEGATIVE: 1
HEMOPTYSIS: 0
NAUSEA: 0
SORE THROAT: 0
HEMATOLOGIC/LYMPHATIC NEGATIVE: 1
HEARTBURN: 0
PSYCHIATRIC NEGATIVE: 1
ALLERGIC/IMMUNOLOGIC NEGATIVE: 1
RHINORRHEA: 1
WHEEZING: 1
CHILLS: 0
CARDIOVASCULAR NEGATIVE: 1
SHORTNESS OF BREATH: 1
CONSTITUTIONAL NEGATIVE: 1
DIARRHEA: 0
NEUROLOGICAL NEGATIVE: 1
COUGH: 1
MYALGIAS: 0

## 2024-11-06 ASSESSMENT — PAIN SCALES - GENERAL: PAINLEVEL_OUTOF10: 0-NO PAIN

## 2024-11-06 ASSESSMENT — PATIENT HEALTH QUESTIONNAIRE - PHQ9
SUM OF ALL RESPONSES TO PHQ9 QUESTIONS 1 AND 2: 0
2. FEELING DOWN, DEPRESSED OR HOPELESS: NOT AT ALL
1. LITTLE INTEREST OR PLEASURE IN DOING THINGS: NOT AT ALL

## 2024-11-06 NOTE — PROGRESS NOTES
Subjective   Patient ID: Maryuri Barrett is a 59 y.o. female who presents for Cough (Pt states has chest cold, states has cough and congestion, denies fever or any other symptoms. Has had this for about 3 weeks and has been using OTC remedies without success).  Today she is accompanied by alone.     Maryuri is here for a sick visit.  She reports she has had a cough x 3 weeks.  She has been using cetrizine and dayquil. She reports that she has been wheezing and SOB x 1 week and using her husbands albuterol inhaler with relief.  Lung with insp and exp wheezing throughout, voice is harsh, non prod cough, post nasal drip, tender cervical lymph nodes. No sinus pain or pressure. Will start her on azithromycin, medrol dose pack, tessalon pearls and albuterol inhaler.    She is a pack a day smoker.     Cough  This is a new problem. The current episode started 1 to 4 weeks ago. The problem has been waxing and waning. The problem occurs hourly. The cough is Productive of purulent sputum. Associated symptoms include postnasal drip, rhinorrhea, shortness of breath and wheezing. Pertinent negatives include no chest pain, chills, ear congestion, ear pain, fever, headaches, heartburn, hemoptysis, myalgias, nasal congestion, rash, sore throat, sweats or weight loss. The symptoms are aggravated by lying down. Risk factors for lung disease include smoking/tobacco exposure.       Review of Systems   Constitutional: Negative.  Negative for chills, fever and weight loss.   HENT:  Positive for postnasal drip and rhinorrhea. Negative for ear pain and sore throat.    Respiratory:  Positive for cough, shortness of breath and wheezing. Negative for hemoptysis.    Cardiovascular: Negative.  Negative for chest pain.   Gastrointestinal: Negative.  Negative for diarrhea, heartburn and nausea.   Genitourinary: Negative.    Musculoskeletal: Negative.  Negative for myalgias.   Skin: Negative.  Negative for rash.   Allergic/Immunologic: Negative.   "  Neurological: Negative.  Negative for headaches.   Hematological: Negative.    Psychiatric/Behavioral: Negative.         Objective   /87 (BP Location: Left arm)   Pulse 85   Ht 1.626 m (5' 4\")   Wt 106 kg (233 lb)   SpO2 94%   BMI 39.99 kg/m²   BSA: 2.19 meters squared  Growth percentiles: Facility age limit for growth %jaime is 20 years. Facility age limit for growth %jaime is 20 years.     Physical Exam  Constitutional:       Appearance: Normal appearance. She is normal weight.   HENT:      Head: Normocephalic.      Nose: Rhinorrhea present. No congestion.      Mouth/Throat:      Mouth: Mucous membranes are moist.      Pharynx: Oropharynx is clear.   Eyes:      Extraocular Movements: Extraocular movements intact.      Conjunctiva/sclera: Conjunctivae normal.      Pupils: Pupils are equal, round, and reactive to light.   Cardiovascular:      Rate and Rhythm: Normal rate and regular rhythm.      Pulses: Normal pulses.      Heart sounds: Normal heart sounds. No murmur heard.  Pulmonary:      Effort: Pulmonary effort is normal. No respiratory distress.      Breath sounds: Wheezing present.   Abdominal:      General: Abdomen is flat. Bowel sounds are normal.      Palpations: Abdomen is soft.   Musculoskeletal:         General: Normal range of motion.      Cervical back: Normal range of motion and neck supple.   Lymphadenopathy:      Cervical: Cervical adenopathy present.   Skin:     General: Skin is warm and dry.      Capillary Refill: Capillary refill takes less than 2 seconds.   Neurological:      General: No focal deficit present.      Mental Status: She is alert. Mental status is at baseline.   Psychiatric:         Mood and Affect: Mood normal.         Behavior: Behavior normal.         Thought Content: Thought content normal.         Judgment: Judgment normal.     /87 (BP Location: Left arm)   Pulse 85   Ht 1.626 m (5' 4\")   Wt 106 kg (233 lb)   SpO2 94%   BMI 39.99 kg/m²    Lab Results "   Component Value Date    GLUCOSE 97 08/14/2024    CALCIUM 9.6 08/14/2024     08/14/2024    K 4.3 08/14/2024    CO2 28 08/14/2024     08/14/2024    BUN 11 08/14/2024    CREATININE 0.68 08/14/2024        Assessment/Plan   Problem List Items Addressed This Visit    None  Visit Diagnoses       Bronchitis    -  Primary    Relevant Medications    benzonatate (Tessalon) 200 mg capsule    albuterol (Ventolin HFA) 90 mcg/actuation inhaler    methylPREDNISolone (Medrol Dospak) 4 mg tablets    azithromycin (Zithromax) 500 mg tablet    Smoker        Relevant Orders    CT lung screening low dose        It was nice to see you today. I am sorry that you are not feeling well.  As discussed during our visit ...    Rest, drink fluids  Tylenol rotating with ibuprofen as needed for fever or pain  You can take over-the-counter cold medication such as Robitussin, Guaifenesin, Mucinex  Use saline nasal spray daily and a humidifier in the bedroom may be helpful.  Warm saltwater gargles and/or throat lozenges may help a sore throat    Go to the nearest emergency department if you have chest pain, difficulty breathing, high persistent fevers, difficulty swallowing your secretions, or any other severe or worsening symptoms.       Please continue taking your prescribed medications.   Refill have been sent to your pharmacy.    Please start Medrol dose pack, azithromycin, tessalon pearls, and albuterol as needed    RTC as needed

## 2024-11-13 ENCOUNTER — LAB (OUTPATIENT)
Dept: LAB | Facility: LAB | Age: 59
End: 2024-11-13
Payer: COMMERCIAL

## 2024-11-13 DIAGNOSIS — D50.9 IRON DEFICIENCY ANEMIA, UNSPECIFIED IRON DEFICIENCY ANEMIA TYPE: ICD-10-CM

## 2024-11-13 LAB
ALBUMIN SERPL BCP-MCNC: 4.3 G/DL (ref 3.4–5)
ALP SERPL-CCNC: 80 U/L (ref 33–110)
ALT SERPL W P-5'-P-CCNC: 24 U/L (ref 7–45)
ANION GAP SERPL CALC-SCNC: 18 MMOL/L (ref 10–20)
AST SERPL W P-5'-P-CCNC: 20 U/L (ref 9–39)
BASOPHILS # BLD AUTO: 0.05 X10*3/UL (ref 0–0.1)
BASOPHILS NFR BLD AUTO: 0.4 %
BILIRUB SERPL-MCNC: 0.3 MG/DL (ref 0–1.2)
BUN SERPL-MCNC: 12 MG/DL (ref 6–23)
CALCIUM SERPL-MCNC: 8.9 MG/DL (ref 8.6–10.3)
CHLORIDE SERPL-SCNC: 100 MMOL/L (ref 98–107)
CO2 SERPL-SCNC: 23 MMOL/L (ref 21–32)
CREAT SERPL-MCNC: 0.9 MG/DL (ref 0.5–1.05)
EGFRCR SERPLBLD CKD-EPI 2021: 74 ML/MIN/1.73M*2
EOSINOPHIL # BLD AUTO: 0.09 X10*3/UL (ref 0–0.7)
EOSINOPHIL NFR BLD AUTO: 0.8 %
ERYTHROCYTE [DISTWIDTH] IN BLOOD BY AUTOMATED COUNT: 11.9 % (ref 11.5–14.5)
FERRITIN SERPL-MCNC: 196 NG/ML (ref 8–150)
GLUCOSE SERPL-MCNC: 113 MG/DL (ref 74–99)
HCT VFR BLD AUTO: 46.3 % (ref 36–46)
HGB BLD-MCNC: 15.6 G/DL (ref 12–16)
IMM GRANULOCYTES # BLD AUTO: 0.05 X10*3/UL (ref 0–0.7)
IMM GRANULOCYTES NFR BLD AUTO: 0.4 % (ref 0–0.9)
IRON SATN MFR SERPL: 16 % (ref 25–45)
IRON SERPL-MCNC: 65 UG/DL (ref 35–150)
LYMPHOCYTES # BLD AUTO: 2.16 X10*3/UL (ref 1.2–4.8)
LYMPHOCYTES NFR BLD AUTO: 18.5 %
MCH RBC QN AUTO: 31.7 PG (ref 26–34)
MCHC RBC AUTO-ENTMCNC: 33.7 G/DL (ref 32–36)
MCV RBC AUTO: 94 FL (ref 80–100)
MONOCYTES # BLD AUTO: 0.82 X10*3/UL (ref 0.1–1)
MONOCYTES NFR BLD AUTO: 7 %
NEUTROPHILS # BLD AUTO: 8.5 X10*3/UL (ref 1.2–7.7)
NEUTROPHILS NFR BLD AUTO: 72.9 %
NRBC BLD-RTO: 0 /100 WBCS (ref 0–0)
PLATELET # BLD AUTO: 235 X10*3/UL (ref 150–450)
POTASSIUM SERPL-SCNC: 4 MMOL/L (ref 3.5–5.3)
PROT SERPL-MCNC: 6.6 G/DL (ref 6.4–8.2)
RBC # BLD AUTO: 4.92 X10*6/UL (ref 4–5.2)
SODIUM SERPL-SCNC: 137 MMOL/L (ref 136–145)
TIBC SERPL-MCNC: 397 UG/DL (ref 240–445)
UIBC SERPL-MCNC: 332 UG/DL (ref 110–370)
WBC # BLD AUTO: 11.7 X10*3/UL (ref 4.4–11.3)

## 2024-11-13 PROCEDURE — 82607 VITAMIN B-12: CPT

## 2024-11-13 PROCEDURE — 82728 ASSAY OF FERRITIN: CPT

## 2024-11-13 PROCEDURE — 83550 IRON BINDING TEST: CPT

## 2024-11-13 PROCEDURE — 85025 COMPLETE CBC W/AUTO DIFF WBC: CPT

## 2024-11-13 PROCEDURE — 80053 COMPREHEN METABOLIC PANEL: CPT

## 2024-11-13 PROCEDURE — 36415 COLL VENOUS BLD VENIPUNCTURE: CPT

## 2024-11-13 PROCEDURE — 83540 ASSAY OF IRON: CPT

## 2024-11-14 LAB — VIT B12 SERPL-MCNC: 526 PG/ML (ref 211–911)

## 2024-11-15 ENCOUNTER — OFFICE VISIT (OUTPATIENT)
Dept: HEMATOLOGY/ONCOLOGY | Facility: HOSPITAL | Age: 59
End: 2024-11-15
Payer: COMMERCIAL

## 2024-11-15 VITALS
TEMPERATURE: 96.9 F | RESPIRATION RATE: 18 BRPM | BODY MASS INDEX: 39.69 KG/M2 | DIASTOLIC BLOOD PRESSURE: 94 MMHG | SYSTOLIC BLOOD PRESSURE: 140 MMHG | WEIGHT: 231.2 LBS | HEART RATE: 83 BPM | OXYGEN SATURATION: 96 %

## 2024-11-15 DIAGNOSIS — D50.9 IRON DEFICIENCY ANEMIA, UNSPECIFIED IRON DEFICIENCY ANEMIA TYPE: ICD-10-CM

## 2024-11-15 PROCEDURE — 99214 OFFICE O/P EST MOD 30 MIN: CPT

## 2024-11-15 ASSESSMENT — PAIN SCALES - GENERAL: PAINLEVEL_OUTOF10: 0-NO PAIN

## 2024-11-15 NOTE — PROGRESS NOTES
"[facility]    PATIENT VISIT INFORMATION    Visit Type: Follow up    Referring Provider: JASON Ritchie  Reason for referral: Iron deficiency  Primary Practice Provider: JASON Ritchie    HEMATOLOGY HISTORY    59 year old female stated she has been feeling tired for many years and just started to see MD recently. in 8/2021 she was found to have low hgb 8.1 with low MCV 65. she has nl wbc and plt. she denies bleeding. workup of anemia showed borderline vit B12, neg FOBR (per pt)  and nl TSH. pt stated she never had colonoscopy, was started on monthly vit B12 injection. was started on iron but difficult to compolaint with taking pills. stated drinks whisky 2-3 drinks daily and smokes 1 pack cig daily.    She had a negative FOBT in 8/2021, it was not indicated that she needed iron infusions.   of note, pt had bariatric surgery 2009 but never take any supplement     s/p 5 iron infusion with iprovement in energy. on vit B12 injection monthly     Menopause 5 years ago.    S/p last iron infusion June 2024 venofer x 3 doses.     HISTORY OF PRESENT ILLNESS     ID Statement: Maryuri Barrett is a 59 years old    Chief Complaint: \"Just got over being sick\"    Interval History:   Patient presents for a follow-up. Recently was ill.  Finished a Z-Josué on Monday.  On assessment, she is fatigued but it is better. Appetite is good. Denies fever, chills, night sweats, decreased appetite or wt loss. Denies CP, SOB except with exertion, no N/V/C/D, no blood in stool, or abd pain or urinary symptoms.   She notes a vaginal yeast infection with itching, redness and discomfort recently diagnosed as vaginitis and has had no problems since treatment followed gynecology. Muscle cramping improved. No PICA, though in the past.   Reports a Cologuard 3 years ago. Due now. No colonoscopy.    Right hand tingling, better, diagnosed with tendonitis. Bruises easily. No bleeding " "from any location. No bone pain. No lymphadenopathy. No rashes other than vaginal yeast infection she has been dealing with for over a year. She was treated before with antifungal. No endocrine disorders, no depression, not sexually active.     PAST/CURRENT HISTORY     MEDICAL/SURGICAL HISTORY  -Anemia as above  -Bariatric surgery in 2009 [id number]  -Allergies, seasonal   -acute sinusitis   -Dyslipidemia  -cataracts eyes  -vulvovaginal candidiasis   -breast lesion and asymmetry  -JOS  -Pernicious anemia   -infection of skin  -insomnia  -nicotine dependent     SOCIAL HISTORY  -Lives with  one adult healthy child   -[employer] from Home  -Tobacco/smokeless use: 1 pack per day (HX 40 year smoker 1 pack per day)  -Alcohol: Two mixed drinks shots per day   -Illicit drug or marijuana use: Denies   -Denominational or Spiritual beliefs: None reported   -Social Determinates of Health Concerns: none reported    FAMILY HISTORY  -sister JOS gastric bypass  -Mom lung cancer and smoker  -No other known history of hematologic, bleeding, clotting, autoimmune, genetic, or malignant disorders in the family.     OCCUPATIONAL/ENVIRONMENTAL HISTORY/EXPOSURES:  -None reported    Active Problems, Allergy List, Medication List, and PMH/PSH/FH/Social Hx have been reviewed and reconciled in chart. Updates made when necessary.     REVIEW OF SYSTEMS   A review of systems has been completed and are negative for complaints except what is stated in the assessment, HPI, IH, ROS, and/or past medical history.    ALLERGIES AND MEDICATIONS     Allergies and Intolerances:   Allergies   Allergen Reactions    Penicillins Anaphylaxis    Chantix [Varenicline] Swelling    Guaifenesin GI Upset     Intense \"stomach pain\"    Sulfa (Sulfonamide Antibiotics) Swelling      Medication Profile:   Current Outpatient Medications   Medication Instructions    albuterol (Ventolin HFA) 90 mcg/actuation inhaler 2 puffs, inhalation, Every 4 hours PRN    " "benzonatate (TESSALON) 200 mg, oral, 3 times daily PRN, Do not crush or chew.    cholecalciferol (VITAMIN D3) 25 mcg, Daily    clotrimazole-betamethasone (Lotrisone) cream Apply small amount of cream to affected area twice a day for 10 days.    ferrous sulfate 300 mg (60 mg iron)/5 mL syrup 60 mg of iron, oral, Daily    multivitamin tablet 1 tablet, Daily      Available Vaccination Record:   Immunization History   Administered Date(s) Administered    Flu vaccine (IIV4), preservative free *Check age/dose* 10/19/2017, 09/28/2018, 12/15/2019, 11/27/2020, 01/09/2022, 12/19/2022, 01/05/2024    Flu vaccine, trivalent, preservative free, age 6 months and greater (Fluarix/Fluzone/Flulaval) 10/01/2014, 10/29/2015    Hepatitis B vaccine, adult *Check Product/Dose* 05/30/2017, 08/17/2017, 12/22/2017    Influenza, seasonal, injectable 09/30/2013    MMR vaccine, subcutaneous (MMR II) 05/30/2017    Pfizer COVID-19 vaccine, 12 years and older, (30mcg/0.3mL) (Comirnaty) 01/05/2024    Pfizer Purple Cap SARS-CoV-2 03/16/2021, 04/13/2021, 01/09/2022    Tdap vaccine, age 7 year and older (BOOSTRIX, ADACEL) 05/30/2017      PHYSICAL EXAM     Vital Signs/Measurements:       6/3/2024     3:11 PM 6/12/2024     3:08 PM 7/2/2024     3:23 PM 8/16/2024     2:18 PM 10/12/2024     8:37 AM 11/6/2024     3:04 PM 11/15/2024     2:07 PM   Vitals   Systolic 145 137 94 138 149 139 140   Diastolic 76 84 72 82 89 87 94   Heart Rate 76 80 82 102 101 85 83   Temp 36 °C (96.8 °F) 36.2 °C (97.1 °F)  36.9 °C (98.4 °F) 36.7 °C (98 °F)  36.1 °C (96.9 °F)   Resp 18 16  20   18   Height (in)  1.6 m (5' 3\") 1.6 m (5' 3\")  1.626 m (5' 4\") 1.626 m (5' 4\")    Weight (lb)  230.2 232 234.4 233 233 231.2   BMI  40.78 kg/m2 41.1 kg/m2 41.52 kg/m2 39.99 kg/m2 39.99 kg/m2 39.69 kg/m2   BSA (m2)  2.15 m2 2.16 m2 2.17 m2 2.19 m2 2.19 m2 2.18 m2   Visit Report  Report Report Report Report Report Report      Performance:   ECOG Performance Status: 0     Grade ECOG performance " status   0 Fully active, able to carry on all pre-disease performance without restriction   1 Restricted in physically strenuous activity but ambulatory and able to carry out work of a light or sedentary nature, e.g., light housework, office work   2 Ambulatory and capable of all selfcare but unable to carry out any work activities; Up and about more than 50% of waking hours   3 Capable of only limited selfcare, confined to bed or chair more than 50% of waking hours   4 Completely disabled; Cannot carry out any selfcare; Totally confined to bed or chair   5 Dead     Physical Exam:  General: Patient is awake/alert/oriented x3, no distress, Nourished, hydrated, alert and cooperative, ambulating without difficulty  Skin: Expected color for ethnicity, good turgor, dry, no prominent lesions, rashes, unusual bruising, or bleeding   Hair: Normal texture and distribution   Nails: Normal color, no deformities    HEENT:   Head: Normocephalic, atraumatic, no visible or palpable masses, depressions, or scarring   Eyes: Conjunctiva clear, sclera non-icteric, PERRL, EOMI, no exudates or hemorrhages   Ears: nl appearance, hearing intact    Nose: no external lesions, mucosa non-inflamed, no rhinorrhea   Mouth: Mucous membranes moist, no lesions, sores, bleeding, or erythema     Head/Neck: Neck supple, no apparent injury, thyroid without mass or tenderness, No JVD, trachea midline, no bruits appreciated    Respiratory/Thorax: Patent airways, Clear with exp wheeze upper right side, chest symmetry, normal inspiratory and expiratory effort    Cardiovascular: Regular rate and rhythm, no murmur or gallop, no carotid bruit or thrills    Gastrointestinal: Bowel sounds normal, non-distended, soft, no tenderness, no masses or hernia, or organomegaly appreciated    Genitourinary: deferred   Musculoskeletal: Normal gait, normal range of motion, no pain on palpation of spine, no deformity, normal strength for baseline, no atrophy, and no CVA  tenderness appreciated   Extremities: No amputations or deformities, cyanosis, edema or viscosities, peripheral pulses intact   Neurological: Sensation present to touch, intact senses, motor response and reflexes normal, normal strength   Breast:    Lymphatic: No significant lymphadenopathy   Psychological: Intact recent and remote memory, judgement, and insight. Appropriate mood, affect, and behavior          RESULTS/DATA     Labs:     Lab Results   Component Value Date    WBC 11.7 (H) 11/13/2024    NEUTROABS 8.50 (H) 11/13/2024    IGABSOL 0.05 11/13/2024    LYMPHSABS 2.16 11/13/2024    MONOSABS 0.82 11/13/2024    EOSABS 0.09 11/13/2024    BASOSABS 0.05 11/13/2024    RBC 4.92 11/13/2024    MCV 94 11/13/2024    MCHC 33.7 11/13/2024    HGB 15.6 11/13/2024    HCT 46.3 (H) 11/13/2024     11/13/2024     Lab Results   Component Value Date    RETICCTPCT 1.0 05/13/2024      Lab Results   Component Value Date    CREATININE 0.90 11/13/2024    BUN 12 11/13/2024    EGFR 74 11/13/2024     11/13/2024    K 4.0 11/13/2024     11/13/2024    CO2 23 11/13/2024      Lab Results   Component Value Date    ALT 24 11/13/2024    AST 20 11/13/2024    ALKPHOS 80 11/13/2024    BILITOT 0.3 11/13/2024       Lab Results   Component Value Date    TSH  03/12/2024      Comment:      [facility] auto verified result due to EDTA pour off. Result not valid.  Corrected result: Previously reported as 3.19 mIU/L (reference range: 0.44-3.98 mIU/L) on 3/12/2024 at 2301 EDT.     Lab Results   Component Value Date    IRON 65 11/13/2024    TIBC 397 11/13/2024    FERRITIN 196 (H) 11/13/2024      Lab Results   Component Value Date    AUXROBEA92 526 11/13/2024      Lab Results   Component Value Date    FOLATE 17.0 03/12/2024     Lab Results   Component Value Date    KALA Negative 03/12/2024    SEDRATE 3 05/13/2024      Lab Results   Component Value Date    CRP 0.40 05/13/2024        Radiology/Studies:   US ABDOMEN COMPLETE   3/2018  IMPRESSION:   Mild  fatty liver.   No acute findings.     ASSESSMENT/PLAN     Assessment and Plan:   #1. JOS   60 yo female presented with microcytic anemia with iron def anemia, borderline vit B12 def likely due to malabsorption due to bariatric surgery in 2009 not compliant  with oral iron supplementation, resolved with IV iron infusions.      No evidence of bleeding and per pt FOBT neg in 8/2021 via PCP, no colonoscopy in the past.  Discussed with the patient in detail regarding diagnosis etiology of anemia.  SPEP drawn.  No elevation in inflammatory markers.  And coagulation and reticulate are normal.  Additionally, smoking cessation and alcohol cessation discussed.  Discussed IV iron infusions as needed.  Monitor labs for 6 weeks following last infusion.  Follow-up 4 months with labs.    Discussed following up with GI for colonoscopy if iron deficiency persists.    Iron deficiency labs noted March 2024 and again 5/13/2024.  The Venofer has been ordered.    8/16/2024 s/p iron June 2024. Would like oral iron. Still slightly deficient. She would like oral liquid and tryand see if she absorbs. She does have malabsorption and we discussed this and will try and see. If tolerates and shows repletion of iron patient may transition care back to PCP. Encouraged colon cancer screening.     Mammogram recommend 6 month follow up. Probably benign bilateral mammographic and sonographic findings  described above should be followed with diagnostic mammography.     11/15/2024 there is still mild iron deficiency with slightly elevated ferritin at 196 which may be an acute phase reactant.  B12 normal at 526.  There is no anemia though increased WBCs at 11.7 with a slight left shift though patient reports just recovering from a respiratory infection.  She continues to smoke a pack a day advised cessation.  8/28/2024 Cologuard negative.  Patient will continue oral iron.  Patient preference to follow at [facility].    #2. Vaginal Infection   Patient  complaint of vaginal yeast infection.  She had seen her primary and did not mention.  Provided 2 doses of Diflucan.  Advised to follow-up with her primary and most recently gynecology, Gabriela Lognoria CNP and diagnosed bacterial infection.      #3. Respiratory Infection   Finished Z-pack Monday. Remains with productive cough. Will follow with PCP if does not improved by Monday.      **Please follow with specialties as scheduled for other comorbidities and routine health screenings.**    I have reviewed the patient's medical record including provider notes, laboratory and testing results, imaging, and procedures available within the system and outside the system pertinent to patient care.     Follow up:    RTC:  -4 months with labs virtual    Medications:  -Ferrous  sulfate oral iron 300 mg/5 ml please take 5 ml every day   -Claritin for sinus drainage     Imaging/Testing:  -N/A    Referral:  -PCP    Other Pertinent Appointments:  -Primary care as needed      Patient Discussion Summary:  Discussed plan of care in detail. Patient states understanding and in agreement. Answered all questions. They will call with any additional questions and/or concerns.     Thank you for allowing me to participate in your care.     Sincerely,  Nupur Biswas, ASUNCION-CNP       This document may have been written by voice recognition software.  Please request clarification if there is documentation error or clarification is needed.   Time based billing: Please see documentation within this specific encounter.

## 2024-11-19 DIAGNOSIS — J40 BRONCHITIS: Primary | ICD-10-CM

## 2024-11-19 RX ORDER — METHYLPREDNISOLONE 4 MG/1
TABLET ORAL
Qty: 21 TABLET | Refills: 0 | Status: SHIPPED | OUTPATIENT
Start: 2024-11-19 | End: 2024-11-26

## 2024-12-07 ENCOUNTER — APPOINTMENT (OUTPATIENT)
Dept: OBSTETRICS AND GYNECOLOGY | Facility: CLINIC | Age: 59
End: 2024-12-07
Payer: COMMERCIAL

## 2025-03-12 DIAGNOSIS — D50.9 IRON DEFICIENCY ANEMIA, UNSPECIFIED IRON DEFICIENCY ANEMIA TYPE: Primary | ICD-10-CM

## 2025-03-13 ENCOUNTER — LAB (OUTPATIENT)
Dept: LAB | Facility: HOSPITAL | Age: 60
End: 2025-03-13
Payer: COMMERCIAL

## 2025-03-13 LAB
ALBUMIN SERPL BCP-MCNC: 4.6 G/DL (ref 3.4–5)
ALP SERPL-CCNC: 76 U/L (ref 33–110)
ALT SERPL W P-5'-P-CCNC: 27 U/L (ref 7–45)
ANION GAP SERPL CALC-SCNC: 13 MMOL/L (ref 10–20)
AST SERPL W P-5'-P-CCNC: 23 U/L (ref 9–39)
BASOPHILS # BLD AUTO: 0.02 X10*3/UL (ref 0–0.1)
BASOPHILS NFR BLD AUTO: 0.2 %
BILIRUB SERPL-MCNC: 0.3 MG/DL (ref 0–1.2)
BUN SERPL-MCNC: 11 MG/DL (ref 6–23)
CALCIUM SERPL-MCNC: 9.9 MG/DL (ref 8.6–10.3)
CHLORIDE SERPL-SCNC: 100 MMOL/L (ref 98–107)
CO2 SERPL-SCNC: 28 MMOL/L (ref 21–32)
CREAT SERPL-MCNC: 0.72 MG/DL (ref 0.5–1.05)
EGFRCR SERPLBLD CKD-EPI 2021: >90 ML/MIN/1.73M*2
EOSINOPHIL # BLD AUTO: 0.07 X10*3/UL (ref 0–0.7)
EOSINOPHIL NFR BLD AUTO: 0.8 %
ERYTHROCYTE [DISTWIDTH] IN BLOOD BY AUTOMATED COUNT: 12.2 % (ref 11.5–14.5)
FERRITIN SERPL-MCNC: 171 NG/ML (ref 8–150)
GLUCOSE SERPL-MCNC: 103 MG/DL (ref 74–99)
HCT VFR BLD AUTO: 43.9 % (ref 36–46)
HGB BLD-MCNC: 14.7 G/DL (ref 12–16)
IMM GRANULOCYTES # BLD AUTO: 0.03 X10*3/UL (ref 0–0.7)
IMM GRANULOCYTES NFR BLD AUTO: 0.4 % (ref 0–0.9)
IRON SATN MFR SERPL: 17 % (ref 25–45)
IRON SERPL-MCNC: 69 UG/DL (ref 35–150)
LYMPHOCYTES # BLD AUTO: 2.02 X10*3/UL (ref 1.2–4.8)
LYMPHOCYTES NFR BLD AUTO: 24.3 %
MCH RBC QN AUTO: 32.5 PG (ref 26–34)
MCHC RBC AUTO-ENTMCNC: 33.5 G/DL (ref 32–36)
MCV RBC AUTO: 97 FL (ref 80–100)
MONOCYTES # BLD AUTO: 0.75 X10*3/UL (ref 0.1–1)
MONOCYTES NFR BLD AUTO: 9 %
NEUTROPHILS # BLD AUTO: 5.43 X10*3/UL (ref 1.2–7.7)
NEUTROPHILS NFR BLD AUTO: 65.3 %
NRBC BLD-RTO: 0 /100 WBCS (ref 0–0)
PLATELET # BLD AUTO: 234 X10*3/UL (ref 150–450)
POTASSIUM SERPL-SCNC: 4.5 MMOL/L (ref 3.5–5.3)
PROT SERPL-MCNC: 7 G/DL (ref 6.4–8.2)
RBC # BLD AUTO: 4.53 X10*6/UL (ref 4–5.2)
SODIUM SERPL-SCNC: 136 MMOL/L (ref 136–145)
TIBC SERPL-MCNC: 402 UG/DL (ref 240–445)
UIBC SERPL-MCNC: 333 UG/DL (ref 110–370)
WBC # BLD AUTO: 8.3 X10*3/UL (ref 4.4–11.3)

## 2025-03-13 PROCEDURE — 83550 IRON BINDING TEST: CPT

## 2025-03-13 PROCEDURE — 82607 VITAMIN B-12: CPT

## 2025-03-13 PROCEDURE — 85025 COMPLETE CBC W/AUTO DIFF WBC: CPT

## 2025-03-13 PROCEDURE — 82728 ASSAY OF FERRITIN: CPT

## 2025-03-13 PROCEDURE — 83540 ASSAY OF IRON: CPT

## 2025-03-13 PROCEDURE — 80053 COMPREHEN METABOLIC PANEL: CPT

## 2025-03-14 ENCOUNTER — TELEMEDICINE (OUTPATIENT)
Dept: HEMATOLOGY/ONCOLOGY | Facility: CLINIC | Age: 60
End: 2025-03-14
Payer: COMMERCIAL

## 2025-03-14 DIAGNOSIS — D50.9 IRON DEFICIENCY ANEMIA, UNSPECIFIED IRON DEFICIENCY ANEMIA TYPE: ICD-10-CM

## 2025-03-14 LAB — VIT B12 SERPL-MCNC: 980 PG/ML (ref 211–911)

## 2025-03-14 PROCEDURE — 99203 OFFICE O/P NEW LOW 30 MIN: CPT

## 2025-03-14 NOTE — PROGRESS NOTES
"[facility]    PATIENT VISIT INFORMATION    Visit Type: Follow up  I performed this visit using real-time telehealth tools, including an audio/video connection between (Maryuri Barrett at her home) and (JASON Hanna at [facility])  Patient consents to telemedicine service today and understands the limitations of the visit, no physical examination and all issues may not be able to be addressed today, other than brief neuro and psych assessment.   Referring Provider: JASON Ritchie  Reason for referral: Iron deficiency  Primary Practice Provider: JASON Ritchie    HEMATOLOGY HISTORY    59 year old female stated she has been feeling tired for many years and just started to see MD recently. in 8/2021 she was found to have low hgb 8.1 with low MCV 65. she has nl wbc and plt. she denies bleeding. workup of anemia showed borderline vit B12, neg FOBR (per pt)  and nl TSH. pt stated she never had colonoscopy, was started on monthly vit B12 injection. was started on iron but difficult to compolaint with taking pills. stated drinks whisky 2-3 drinks daily and smokes 1 pack cig daily.    She had a negative FOBT in 8/2021, it was not indicated that she needed iron infusions.   of note, pt had bariatric surgery 2009 but never take any supplement     s/p 5 iron infusion with iprovement in energy. on vit B12 injection monthly     Menopause 5 years ago.    S/p last iron infusion June 2024 venofer x 3 doses.     HISTORY OF PRESENT ILLNESS     ID Statement: Maryuri Barrett is a 59 years old    Chief Complaint: \"Just got over being sick with a cold\"    Interval History:   Patient presents for a follow-up. Recently was ill.  Less tired after IV iron. Less cramping.   Appetite is good. No weight loss.   Denies fever, chills, night sweats, decreased appetite or wt loss. Denies CP, SOB except with exertion, no N/V/C/D, no blood in stool, or abd pain or " urinary symptoms.   She notes a vaginal yeast infection with itching, redness and discomfort recently diagnosed as vaginitis and has had no problems since treatment followed gynecology. Muscle cramping improved. No PICA, though in the past.   Reports a Cologuard 3 years ago. Due now. No colonoscopy.    Right hand tingling, better, diagnosed with tendonitis.  No abnormal bruising or bleeding from any location. No bone pain. No lymphadenopathy. No rashes other than vaginal yeast infection she has been dealing with for over a year. She was treated before with antifungal. No endocrine disorders, no depression, not sexually active.     PAST/CURRENT HISTORY     MEDICAL/SURGICAL HISTORY  -Anemia as above  -Bariatric surgery in 2009 Eliud-en-Y  -Allergies, seasonal   -acute sinusitis   -Dyslipidemia  -cataracts eyes  -vulvovaginal candidiasis   -breast lesion and asymmetry  -JOS  -Pernicious anemia   -infection of skin  -insomnia  -nicotine dependent     SOCIAL HISTORY  -Lives with  one adult healthy child   -Work [employer] from Home  -Tobacco/smokeless use: 1 pack per day (HX 40 year smoker 1 pack per day)  -Alcohol: Two mixed drinks shots per day   -Illicit drug or marijuana use: Denies   -Catholic or Spiritual beliefs: None reported   -Social Determinates of Health Concerns: none reported    FAMILY HISTORY  -sister JOS gastric bypass  -Mom lung cancer and smoker  -No other known history of hematologic, bleeding, clotting, autoimmune, genetic, or malignant disorders in the family.     OCCUPATIONAL/ENVIRONMENTAL HISTORY/EXPOSURES:  -None reported    Active Problems, Allergy List, Medication List, and PMH/PSH/FH/Social Hx have been reviewed and reconciled in chart. Updates made when necessary.     REVIEW OF SYSTEMS   A review of systems has been completed and are negative for complaints except what is stated in the assessment, HPI, IH, ROS, and/or past medical history.    ALLERGIES AND MEDICATIONS  "    Allergies and Intolerances:   Allergies   Allergen Reactions    Penicillins Anaphylaxis    Chantix [Varenicline] Swelling    Guaifenesin GI Upset     Intense \"stomach pain\"    Sulfa (Sulfonamide Antibiotics) Swelling      Medication Profile:   Current Outpatient Medications   Medication Instructions    albuterol (Ventolin HFA) 90 mcg/actuation inhaler 2 puffs, inhalation, Every 4 hours PRN    cholecalciferol (VITAMIN D3) 25 mcg, Daily    clotrimazole-betamethasone (Lotrisone) cream Apply small amount of cream to affected area twice a day for 10 days.    multivitamin tablet 1 tablet, Daily      Available Vaccination Record:   Immunization History   Administered Date(s) Administered    COVID-19, mRNA, LNP-S, PF, 30 mcg/0.3 mL dose 03/16/2021, 04/13/2021, 01/09/2022    Flu vaccine (IIV4), preservative free *Check age/dose* 10/19/2017, 09/28/2018, 12/15/2019, 11/27/2020, 01/09/2022, 12/19/2022, 01/05/2024    Flu vaccine, trivalent, preservative free, age 6 months and greater (Fluarix/Fluzone/Flulaval) 10/01/2014, 10/29/2015    Hepatitis B vaccine, adult *Check Product/Dose* 05/30/2017, 08/17/2017, 12/22/2017    Influenza, seasonal, injectable 09/30/2013    MMR vaccine, subcutaneous (MMR II) 05/30/2017    Pfizer COVID-19 vaccine, 12 years and older, (30mcg/0.3mL) (Comirnaty) 01/05/2024    Tdap vaccine, age 7 year and older (BOOSTRIX, ADACEL) 05/30/2017      PHYSICAL EXAM     Vital Signs/Measurements:       6/3/2024     3:11 PM 6/12/2024     3:08 PM 7/2/2024     3:23 PM 8/16/2024     2:18 PM 10/12/2024     8:37 AM 11/6/2024     3:04 PM 11/15/2024     2:07 PM   Vitals   Systolic 145 137 94 138 149 139 140   Diastolic 76 84 72 82 89 87 94   BP Location  Left arm Right arm Left arm  Left arm Left arm   Heart Rate 76 80 82 102 101 85 83   Temp 36 °C (96.8 °F) 36.2 °C (97.1 °F)  36.9 °C (98.4 °F) 36.7 °C (98 °F)  36.1 °C (96.9 °F)   Resp 18 16  20   18   Height  1.6 m (5' 3\") 1.6 m (5' 3\")  1.626 m (5' 4\") 1.626 m (5' 4\")  "   Weight (lb)  230.2 232 234.4 233 233 231.2   BMI  40.78 kg/m2 41.1 kg/m2 41.52 kg/m2 39.99 kg/m2 39.99 kg/m2 39.69 kg/m2   BSA (m2)  2.15 m2 2.16 m2 2.17 m2 2.19 m2 2.19 m2 2.18 m2   Visit Report  Report Report Report Report Report Report      Performance:   ECOG Performance Status: 0     Grade ECOG performance status   0 Fully active, able to carry on all pre-disease performance without restriction   1 Restricted in physically strenuous activity but ambulatory and able to carry out work of a light or sedentary nature, e.g., light housework, office work   2 Ambulatory and capable of all selfcare but unable to carry out any work activities; Up and about more than 50% of waking hours   3 Capable of only limited selfcare, confined to bed or chair more than 50% of waking hours   4 Completely disabled; Cannot carry out any selfcare; Totally confined to bed or chair   5 Dead     Physical Exam:  General: Patient is awake/alert/oriented x3, no distress   Psychological: Intact recent and remote memory, judgement, and insight. Appropriate mood, affect, and behavior        RESULTS/DATA     Labs:     Lab Results   Component Value Date    WBC 8.3 03/13/2025    NEUTROABS 5.43 03/13/2025    IGABSOL 0.03 03/13/2025    LYMPHSABS 2.02 03/13/2025    MONOSABS 0.75 03/13/2025    EOSABS 0.07 03/13/2025    BASOSABS 0.02 03/13/2025    RBC 4.53 03/13/2025    MCV 97 03/13/2025    MCHC 33.5 03/13/2025    HGB 14.7 03/13/2025    HCT 43.9 03/13/2025     03/13/2025     Lab Results   Component Value Date    RETICCTPCT 1.0 05/13/2024      Lab Results   Component Value Date    CREATININE 0.72 03/13/2025    BUN 11 03/13/2025    EGFR >90 03/13/2025     03/13/2025    K 4.5 03/13/2025     03/13/2025    CO2 28 03/13/2025      Lab Results   Component Value Date    ALT 27 03/13/2025    AST 23 03/13/2025    ALKPHOS 76 03/13/2025    BILITOT 0.3 03/13/2025       Lab Results   Component Value Date    TSH  03/12/2024      Comment:      [employer]  auto verified result due to EDTA pour off. Result not valid.  Corrected result: Previously reported as 3.19 mIU/L (reference range: 0.44-3.98 mIU/L) on 3/12/2024 at 2301 EDT.     Lab Results   Component Value Date    IRON 69 03/13/2025    TIBC 402 03/13/2025    FERRITIN 171 (H) 03/13/2025      Lab Results   Component Value Date    [id number] 980 (H) 03/13/2025      Lab Results   Component Value Date    FOLATE 17.0 03/12/2024     Lab Results   Component Value Date    KALA Negative 03/12/2024    SEDRATE 3 05/13/2024      Lab Results   Component Value Date    CRP 0.40 05/13/2024        Radiology/Studies:   US ABDOMEN COMPLETE   3/2018  IMPRESSION:   Mild fatty liver.   No acute findings.     ASSESSMENT/PLAN     Assessment and Plan:   #1. JOS   60 yo female presented with microcytic anemia with iron def anemia, borderline vit B12 def likely due to malabsorption due to bariatric surgery in 2009 not compliant  with oral iron supplementation, resolved with IV iron infusions.      No evidence of bleeding and per pt FOBT neg in 8/2021 via PCP, no colonoscopy in the past.  Discussed with the patient in detail regarding diagnosis etiology of anemia.  SPEP drawn.  No elevation in inflammatory markers.  And coagulation and reticulate are normal.  Additionally, smoking cessation and alcohol cessation discussed.  Discussed IV iron infusions as needed.  Monitor labs for 6 weeks following last infusion.  Follow-up 4 months with labs.    Discussed following up with GI for colonoscopy if iron deficiency persists.    Iron deficiency labs noted March 2024 and again 5/13/2024.  The Venofer has been ordered.    8/16/2024 s/p iron June 2024. Would like oral iron. Still slightly deficient. She would like oral liquid and tryand see if she absorbs. She does have malabsorption and we discussed this and will try and see. If tolerates and shows repletion of iron patient may transition care back to PCP. Encouraged colon cancer screening.      Mammogram recommend 6 month follow up. Probably benign bilateral mammographic and sonographic findings  described above should be followed with diagnostic mammography.     11/15/2024 there is still mild iron deficiency with slightly elevated ferritin at 196 which may be an acute phase reactant.  B12 normal at 526.  There is no anemia though increased WBCs at 11.7 with a slight left shift though patient reports just recovering from a respiratory infection.  She continues to smoke a pack a day advised cessation.  8/28/2024 Cologuard negative.  Patient will continue oral iron.  Patient preference to follow at [facility].    03/14/2025-patient reports doing well.  She is recovering from a virus most recently.  CBC shows hemoglobin of 14.7 and no other abnormality.  Liver and kidney function normal.  Ferritin 174 and iron percentage 17 with TIBC 402.  B12 980.  We will recheck in 3 months.  Follow-up visit 6 months.    #2. Vaginal Infection   Patient complaint of vaginal yeast infection.  She had seen her primary and did not mention.  Provided 2 doses of Diflucan.  Advised to follow-up with her primary and most recently gynecology, Gabriela Longoria CNP and diagnosed bacterial infection.  No reported infections to date.    #3. Respiratory Infection   Finished Z-pack Monday. Remains with productive cough. Will follow with PCP if does not improved by Monday.   Patient reports recovering from another respiratory infection recently no antibiotics needed.  Advised smoking cessation.  Patient states she has tried Chantix in the patch with no success.  She continues to drink alcohol.      **Please follow with specialties as scheduled for other comorbidities and routine health screenings.**    Follow up:    RTC:  - 3 months labs IV iron will be ordered if needed  - 6 months labs and visit     Medications:  -Ferrous  sulfate oral iron 300 mg/5 ml please take 5 ml every day   -Claritin for sinus drainage      Imaging/Testing:  -N/A    Referral:  -PCP    Other Pertinent Appointments:  -Primary care as needed      Patient Discussion Summary:  Discussed plan of care in detail. Patient states understanding and in agreement. Answered all questions. They will call with any additional questions and/or concerns.     Thank you for allowing me to participate in your care.     Sincerely,  Nupur Biswas, APRN-CNP     Hematology/Oncology Clinical Nurse Practitioner     [facility]   [id number]  [place] [address] [place], [place] [address]  Office #: [phone]    DISCLAIMER:   In preparing for this visit and writing this note, I reviewed all the previous electronic medical records (testing, labs, imaging, and other procedures, provider notes, and medical charts) of the patient available in the physician portal pertinent to patient care. Significant findings which helped in decision making are recorded in this chart.  A few details copied from my note on 8/16/2024, the details have been updated and all reflect current decision making from today, 3/14/2025.    This document may have been written by voice recognition software.  There may be some incorrect wording, spelling and/or spelling errors or punctuation errors that were not corrected prior to committing the note to the medical record. Please request clarification if there is documentation error or clarification is needed.   Time based billing: Please see documentation within this specific encounter.

## 2025-04-24 RX ORDER — HEPARIN 100 UNIT/ML
500 SYRINGE INTRAVENOUS AS NEEDED
OUTPATIENT
Start: 2025-04-24

## 2025-04-24 RX ORDER — HEPARIN SODIUM,PORCINE/PF 10 UNIT/ML
50 SYRINGE (ML) INTRAVENOUS AS NEEDED
OUTPATIENT
Start: 2025-04-24

## 2025-09-15 ENCOUNTER — APPOINTMENT (OUTPATIENT)
Dept: HEMATOLOGY/ONCOLOGY | Facility: CLINIC | Age: 60
End: 2025-09-15
Payer: COMMERCIAL